# Patient Record
Sex: MALE | Race: WHITE | NOT HISPANIC OR LATINO | Employment: OTHER | URBAN - NONMETROPOLITAN AREA
[De-identification: names, ages, dates, MRNs, and addresses within clinical notes are randomized per-mention and may not be internally consistent; named-entity substitution may affect disease eponyms.]

---

## 2017-01-01 ENCOUNTER — APPOINTMENT (OUTPATIENT)
Dept: CARDIOLOGY | Facility: HOSPITAL | Age: 72
End: 2017-01-01
Attending: INTERNAL MEDICINE

## 2017-01-01 ENCOUNTER — APPOINTMENT (OUTPATIENT)
Dept: MRI IMAGING | Facility: HOSPITAL | Age: 72
End: 2017-01-01

## 2017-01-01 ENCOUNTER — DOCUMENTATION (OUTPATIENT)
Dept: NEUROLOGY | Facility: HOSPITAL | Age: 72
End: 2017-01-01

## 2017-01-01 ENCOUNTER — HOSPITAL ENCOUNTER (INPATIENT)
Facility: HOSPITAL | Age: 72
LOS: 3 days | End: 2017-01-21
Attending: INTERNAL MEDICINE | Admitting: INTERNAL MEDICINE

## 2017-01-01 ENCOUNTER — APPOINTMENT (OUTPATIENT)
Dept: CT IMAGING | Facility: HOSPITAL | Age: 72
End: 2017-01-01

## 2017-01-01 ENCOUNTER — APPOINTMENT (OUTPATIENT)
Dept: GENERAL RADIOLOGY | Facility: HOSPITAL | Age: 72
End: 2017-01-01

## 2017-01-01 ENCOUNTER — OFFICE VISIT (OUTPATIENT)
Dept: PRIMARY CARE CLINIC | Age: 72
End: 2017-01-01
Payer: MEDICARE

## 2017-01-01 VITALS
HEIGHT: 70 IN | WEIGHT: 227 LBS | RESPIRATION RATE: 16 BRPM | DIASTOLIC BLOOD PRESSURE: 80 MMHG | TEMPERATURE: 96.4 F | SYSTOLIC BLOOD PRESSURE: 160 MMHG | HEART RATE: 60 BPM | BODY MASS INDEX: 32.5 KG/M2 | OXYGEN SATURATION: 98 %

## 2017-01-01 VITALS
BODY MASS INDEX: 31.7 KG/M2 | DIASTOLIC BLOOD PRESSURE: 75 MMHG | TEMPERATURE: 99.2 F | HEART RATE: 51 BPM | OXYGEN SATURATION: 46 % | HEIGHT: 70 IN | WEIGHT: 221.4 LBS | RESPIRATION RATE: 20 BRPM | SYSTOLIC BLOOD PRESSURE: 129 MMHG

## 2017-01-01 DIAGNOSIS — Z13.1 SCREENING FOR DIABETES MELLITUS: ICD-10-CM

## 2017-01-01 DIAGNOSIS — Z79.4 TYPE 2 DIABETES MELLITUS WITHOUT COMPLICATION, WITH LONG-TERM CURRENT USE OF INSULIN (HCC): Primary | ICD-10-CM

## 2017-01-01 DIAGNOSIS — Z12.5 SCREENING FOR MALIGNANT NEOPLASM OF PROSTATE: ICD-10-CM

## 2017-01-01 DIAGNOSIS — Z78.9 IMPAIRED MOBILITY AND ADLS: ICD-10-CM

## 2017-01-01 DIAGNOSIS — R09.2 RESPIRATORY ARREST (HCC): ICD-10-CM

## 2017-01-01 DIAGNOSIS — Z13.6 ENCOUNTER FOR LIPID SCREENING FOR CARDIOVASCULAR DISEASE: ICD-10-CM

## 2017-01-01 DIAGNOSIS — Z79.899 MEDICATION MANAGEMENT: ICD-10-CM

## 2017-01-01 DIAGNOSIS — I21.4 NSTEMI (NON-ST ELEVATED MYOCARDIAL INFARCTION) (HCC): Primary | ICD-10-CM

## 2017-01-01 DIAGNOSIS — Z74.09 IMPAIRED MOBILITY AND ADLS: ICD-10-CM

## 2017-01-01 DIAGNOSIS — I63.9 CEREBROVASCULAR ACCIDENT (CVA), UNSPECIFIED MECHANISM (HCC): ICD-10-CM

## 2017-01-01 DIAGNOSIS — I10 ESSENTIAL HYPERTENSION: ICD-10-CM

## 2017-01-01 DIAGNOSIS — R13.12 OROPHARYNGEAL DYSPHAGIA: ICD-10-CM

## 2017-01-01 DIAGNOSIS — Z13.220 ENCOUNTER FOR LIPID SCREENING FOR CARDIOVASCULAR DISEASE: ICD-10-CM

## 2017-01-01 DIAGNOSIS — Z74.09 IMPAIRED FUNCTIONAL MOBILITY, BALANCE, AND ENDURANCE: ICD-10-CM

## 2017-01-01 DIAGNOSIS — E11.9 TYPE 2 DIABETES MELLITUS WITHOUT COMPLICATION, WITH LONG-TERM CURRENT USE OF INSULIN (HCC): Primary | ICD-10-CM

## 2017-01-01 LAB
ALBUMIN SERPL-MCNC: 3.6 G/DL (ref 3.5–5)
ALBUMIN SERPL-MCNC: 4 G/DL (ref 3.5–5)
ALBUMIN SERPL-MCNC: 4.2 G/DL (ref 3.5–5)
ALBUMIN SERPL-MCNC: 4.3 G/DL (ref 3.5–5.2)
ALBUMIN SERPL-MCNC: 4.4 G/DL (ref 3.5–5)
ALBUMIN/GLOB SERPL: 1.3 G/DL (ref 1.1–2.5)
ALP BLD-CCNC: 72 U/L (ref 40–130)
ALP SERPL-CCNC: 115 U/L (ref 24–120)
ALP SERPL-CCNC: 64 U/L (ref 24–120)
ALP SERPL-CCNC: 65 U/L (ref 24–120)
ALP SERPL-CCNC: 92 U/L (ref 24–120)
ALT SERPL W P-5'-P-CCNC: 22 U/L (ref 0–54)
ALT SERPL W P-5'-P-CCNC: 29 U/L (ref 0–54)
ALT SERPL W P-5'-P-CCNC: 30 U/L (ref 0–54)
ALT SERPL W P-5'-P-CCNC: 32 U/L (ref 0–54)
ALT SERPL-CCNC: 17 U/L (ref 5–41)
AMYLASE SERPL-CCNC: 64 U/L (ref 30–110)
ANION GAP SERPL CALCULATED.3IONS-SCNC: 12 MMOL/L (ref 4–13)
ANION GAP SERPL CALCULATED.3IONS-SCNC: 12 MMOL/L (ref 4–13)
ANION GAP SERPL CALCULATED.3IONS-SCNC: 14 MMOL/L (ref 4–13)
ANION GAP SERPL CALCULATED.3IONS-SCNC: 15 MMOL/L (ref 4–13)
ANION GAP SERPL CALCULATED.3IONS-SCNC: 15 MMOL/L (ref 7–19)
APTT PPP: 29.7 SECONDS (ref 24.1–34.8)
ARTERIAL PATENCY WRIST A: ABNORMAL
ARTICHOKE IGE QN: 162 MG/DL (ref 0–99)
AST SERPL-CCNC: 28 U/L (ref 7–45)
AST SERPL-CCNC: 28 U/L (ref 7–45)
AST SERPL-CCNC: 34 U/L (ref 5–40)
AST SERPL-CCNC: 39 U/L (ref 7–45)
AST SERPL-CCNC: 50 U/L (ref 7–45)
ATMOSPHERIC PRESS: ABNORMAL MMHG
BACTERIA SPEC AEROBE CULT: NORMAL
BACTERIA UR QL AUTO: ABNORMAL /HPF
BASE EXCESS BLDA CALC-SCNC: 3.2 MMOL/L (ref -2–2)
BASOPHILS # BLD AUTO: 0.01 10*3/MM3 (ref 0–0.2)
BASOPHILS # BLD AUTO: 0.02 10*3/MM3 (ref 0–0.2)
BASOPHILS # BLD AUTO: 0.04 10*3/MM3 (ref 0–0.2)
BASOPHILS NFR BLD AUTO: 0.1 % (ref 0–2)
BASOPHILS NFR BLD AUTO: 0.2 % (ref 0–2)
BASOPHILS NFR BLD AUTO: 0.4 % (ref 0–2)
BDY SITE: ABNORMAL
BH CV ECHO MEAS - AO MAX PG (FULL): 4.2 MMHG
BH CV ECHO MEAS - AO MAX PG: 8.1 MMHG
BH CV ECHO MEAS - AO MEAN PG (FULL): 2 MMHG
BH CV ECHO MEAS - AO MEAN PG: 4 MMHG
BH CV ECHO MEAS - AO ROOT AREA (BSA CORRECTED): 2.1
BH CV ECHO MEAS - AO ROOT AREA: 15.9 CM^2
BH CV ECHO MEAS - AO ROOT DIAM: 4.5 CM
BH CV ECHO MEAS - AO V2 MAX: 142 CM/SEC
BH CV ECHO MEAS - AO V2 MEAN: 99.5 CM/SEC
BH CV ECHO MEAS - AO V2 VTI: 33.2 CM
BH CV ECHO MEAS - AVA(I,A): 2.6 CM^2
BH CV ECHO MEAS - AVA(I,D): 2.6 CM^2
BH CV ECHO MEAS - AVA(V,A): 2.6 CM^2
BH CV ECHO MEAS - AVA(V,D): 2.6 CM^2
BH CV ECHO MEAS - BSA(HAYCOCK): 2.2 M^2
BH CV ECHO MEAS - BSA: 2.2 M^2
BH CV ECHO MEAS - BZI_BMI: 31.6 KILOGRAMS/M^2
BH CV ECHO MEAS - BZI_METRIC_HEIGHT: 177.8 CM
BH CV ECHO MEAS - BZI_METRIC_WEIGHT: 99.8 KG
BH CV ECHO MEAS - CONTRAST EF 4CH: 56 ML/M^2
BH CV ECHO MEAS - EDV(CUBED): 153.1 ML
BH CV ECHO MEAS - EDV(MOD-SP4): 44.3 ML
BH CV ECHO MEAS - EDV(TEICH): 138.3 ML
BH CV ECHO MEAS - EF(MOD-SP4): 56 %
BH CV ECHO MEAS - ESV(MOD-SP4): 19.5 ML
BH CV ECHO MEAS - IVS/LVPW: 1
BH CV ECHO MEAS - IVSD: 1.9 CM
BH CV ECHO MEAS - LA DIMENSION: 3.7 CM
BH CV ECHO MEAS - LA/AO: 0.82
BH CV ECHO MEAS - LAT PEAK E' VEL: 7.9 CM/SEC
BH CV ECHO MEAS - LV DIASTOLIC VOL/BSA (35-75): 20.4 ML/M^2
BH CV ECHO MEAS - LV MASS(C)D: 485.8 GRAMS
BH CV ECHO MEAS - LV MASS(C)DI: 223.5 GRAMS/M^2
BH CV ECHO MEAS - LV MAX PG: 3.9 MMHG
BH CV ECHO MEAS - LV MEAN PG: 2 MMHG
BH CV ECHO MEAS - LV SYSTOLIC VOL/BSA (12-30): 9 ML/M^2
BH CV ECHO MEAS - LV V1 MAX: 98.3 CM/SEC
BH CV ECHO MEAS - LV V1 MEAN: 70.2 CM/SEC
BH CV ECHO MEAS - LV V1 VTI: 22.9 CM
BH CV ECHO MEAS - LVIDD: 5.4 CM
BH CV ECHO MEAS - LVLD AP4: 7.5 CM
BH CV ECHO MEAS - LVLS AP4: 7 CM
BH CV ECHO MEAS - LVOT AREA (M): 3.8 CM^2
BH CV ECHO MEAS - LVOT AREA: 3.8 CM^2
BH CV ECHO MEAS - LVOT DIAM: 2.2 CM
BH CV ECHO MEAS - LVPWD: 1.8 CM
BH CV ECHO MEAS - MV A MAX VEL: 89.2 CM/SEC
BH CV ECHO MEAS - MV DEC TIME: 0.09 SEC
BH CV ECHO MEAS - MV E MAX VEL: 89.7 CM/SEC
BH CV ECHO MEAS - MV E/A: 1
BH CV ECHO MEAS - RAP SYSTOLE: 10 MMHG
BH CV ECHO MEAS - RVSP: 34.8 MMHG
BH CV ECHO MEAS - SI(AO): 242.9 ML/M^2
BH CV ECHO MEAS - SI(LVOT): 40 ML/M^2
BH CV ECHO MEAS - SI(MOD-SP4): 11.4 ML/M^2
BH CV ECHO MEAS - SV(AO): 528 ML
BH CV ECHO MEAS - SV(LVOT): 87.1 ML
BH CV ECHO MEAS - SV(MOD-SP4): 24.8 ML
BH CV ECHO MEAS - TR MAX VEL: 249 CM/SEC
BILIRUB SERPL-MCNC: 0.4 MG/DL (ref 0.2–1.2)
BILIRUB SERPL-MCNC: 0.5 MG/DL (ref 0.1–1)
BILIRUB SERPL-MCNC: 0.7 MG/DL (ref 0.1–1)
BILIRUB SERPL-MCNC: 0.7 MG/DL (ref 0.1–1)
BILIRUB SERPL-MCNC: 0.8 MG/DL (ref 0.1–1)
BILIRUB UR QL STRIP: NEGATIVE
BUN BLD-MCNC: 14 MG/DL (ref 5–21)
BUN BLD-MCNC: 15 MG/DL (ref 5–21)
BUN BLD-MCNC: 17 MG/DL (ref 5–21)
BUN BLD-MCNC: 19 MG/DL (ref 5–21)
BUN BLDV-MCNC: 10 MG/DL (ref 8–23)
BUN/CREAT SERPL: 19.2 (ref 7–25)
BUN/CREAT SERPL: 22.4 (ref 7–25)
BUN/CREAT SERPL: 23.6 (ref 7–25)
BUN/CREAT SERPL: 26.4 (ref 7–25)
CALCIUM SERPL-MCNC: 9.3 MG/DL (ref 8.8–10.2)
CALCIUM SPEC-SCNC: 8.6 MG/DL (ref 8.4–10.4)
CALCIUM SPEC-SCNC: 8.8 MG/DL (ref 8.4–10.4)
CALCIUM SPEC-SCNC: 8.9 MG/DL (ref 8.4–10.4)
CALCIUM SPEC-SCNC: 9.1 MG/DL (ref 8.4–10.4)
CHLORIDE BLD-SCNC: 102 MMOL/L (ref 98–111)
CHLORIDE SERPL-SCNC: 100 MMOL/L (ref 98–110)
CHLORIDE SERPL-SCNC: 101 MMOL/L (ref 98–110)
CHLORIDE SERPL-SCNC: 103 MMOL/L (ref 98–110)
CHLORIDE SERPL-SCNC: 107 MMOL/L (ref 98–110)
CHOLEST SERPL-MCNC: 191 MG/DL (ref 130–200)
CHOLESTEROL, TOTAL: 211 MG/DL (ref 160–199)
CK MB SERPL-CCNC: 2.87 NG/ML (ref 0–5)
CK SERPL-CCNC: 115 U/L (ref 0–203)
CLARITY UR: CLEAR
CO2 SERPL-SCNC: 22 MMOL/L (ref 24–31)
CO2 SERPL-SCNC: 22 MMOL/L (ref 24–31)
CO2 SERPL-SCNC: 25 MMOL/L (ref 24–31)
CO2 SERPL-SCNC: 26 MMOL/L (ref 24–31)
CO2: 24 MMOL/L (ref 22–29)
COHGB MFR BLD: 1 % (ref 0–5.1)
COLOR UR: YELLOW
CREAT BLD-MCNC: 0.67 MG/DL (ref 0.5–1.4)
CREAT BLD-MCNC: 0.72 MG/DL (ref 0.5–1.4)
CREAT BLD-MCNC: 0.72 MG/DL (ref 0.5–1.4)
CREAT BLD-MCNC: 0.73 MG/DL (ref 0.5–1.4)
CREAT SERPL-MCNC: 0.8 MG/DL (ref 0.5–1.2)
D DIMER PPP FEU-MCNC: 0.66 MG/L (FEU) (ref 0–0.5)
D-LACTATE SERPL-SCNC: 1 MMOL/L (ref 0.5–2)
D-LACTATE SERPL-SCNC: 1.2 MMOL/L (ref 0.5–2)
DEPRECATED RDW RBC AUTO: 40 FL (ref 40–54)
DEPRECATED RDW RBC AUTO: 40.3 FL (ref 40–54)
DEPRECATED RDW RBC AUTO: 41 FL (ref 40–54)
DEPRECATED RDW RBC AUTO: 41.8 FL (ref 40–54)
EOSINOPHIL # BLD AUTO: 0 10*3/MM3 (ref 0–0.7)
EOSINOPHIL # BLD AUTO: 0.01 10*3/MM3 (ref 0–0.7)
EOSINOPHIL # BLD AUTO: 0.24 10*3/MM3 (ref 0–0.7)
EOSINOPHIL NFR BLD AUTO: 0 % (ref 0–4)
EOSINOPHIL NFR BLD AUTO: 0.1 % (ref 0–4)
EOSINOPHIL NFR BLD AUTO: 2.6 % (ref 0–4)
EPAP: 8
ERYTHROCYTE [DISTWIDTH] IN BLOOD BY AUTOMATED COUNT: 13.5 % (ref 12–15)
ERYTHROCYTE [DISTWIDTH] IN BLOOD BY AUTOMATED COUNT: 13.7 % (ref 12–15)
GFR NON-AFRICAN AMERICAN: >60
GFR SERPL CREATININE-BSD FRML MDRD: 106 ML/MIN/1.73
GFR SERPL CREATININE-BSD FRML MDRD: 107 ML/MIN/1.73
GFR SERPL CREATININE-BSD FRML MDRD: 107 ML/MIN/1.73
GFR SERPL CREATININE-BSD FRML MDRD: 117 ML/MIN/1.73
GLOBULIN UR ELPH-MCNC: 2.8 GM/DL
GLOBULIN UR ELPH-MCNC: 3.2 GM/DL
GLOBULIN UR ELPH-MCNC: 3.2 GM/DL
GLOBULIN UR ELPH-MCNC: 3.3 GM/DL
GLOBULIN: 2.3 G/DL
GLUCOSE BLD-MCNC: 175 MG/DL (ref 74–109)
GLUCOSE BLD-MCNC: 235 MG/DL (ref 70–100)
GLUCOSE BLD-MCNC: 262 MG/DL (ref 70–100)
GLUCOSE BLD-MCNC: 281 MG/DL (ref 70–100)
GLUCOSE BLD-MCNC: 284 MG/DL (ref 70–100)
GLUCOSE BLDC GLUCOMTR-MCNC: 189 MG/DL (ref 70–130)
GLUCOSE BLDC GLUCOMTR-MCNC: 207 MG/DL (ref 70–130)
GLUCOSE BLDC GLUCOMTR-MCNC: 211 MG/DL (ref 70–130)
GLUCOSE BLDC GLUCOMTR-MCNC: 230 MG/DL (ref 70–130)
GLUCOSE BLDC GLUCOMTR-MCNC: 234 MG/DL (ref 70–130)
GLUCOSE BLDC GLUCOMTR-MCNC: 239 MG/DL (ref 70–130)
GLUCOSE BLDC GLUCOMTR-MCNC: 243 MG/DL (ref 70–130)
GLUCOSE BLDC GLUCOMTR-MCNC: 247 MG/DL (ref 70–130)
GLUCOSE BLDC GLUCOMTR-MCNC: 254 MG/DL (ref 70–130)
GLUCOSE BLDC GLUCOMTR-MCNC: 294 MG/DL (ref 70–130)
GLUCOSE BLDC GLUCOMTR-MCNC: 296 MG/DL (ref 70–130)
GLUCOSE UR STRIP-MCNC: ABNORMAL MG/DL
HBA1C MFR BLD: 8 %
HBA1C MFR BLD: 8.1 %
HCO3 BLDA-SCNC: 26.2 MMOL/L (ref 22–26)
HCT VFR BLD AUTO: 38.9 % (ref 40–52)
HCT VFR BLD AUTO: 41.3 % (ref 40–52)
HCT VFR BLD AUTO: 41.6 % (ref 40–52)
HCT VFR BLD AUTO: 42.9 % (ref 40–52)
HCT VFR BLD CALC: 42 % (ref 38–51)
HCT VFR BLD CALC: 43.3 % (ref 42–52)
HDLC SERPL-MCNC: 32 MG/DL (ref 55–121)
HDLC SERPL-MCNC: 33 MG/DL
HEMOGLOBIN: 14.4 G/DL (ref 14–18)
HGB BLD-MCNC: 13.6 G/DL (ref 14–18)
HGB BLD-MCNC: 14.4 G/DL (ref 14–18)
HGB BLD-MCNC: 14.5 G/DL (ref 14–18)
HGB BLD-MCNC: 14.5 G/DL (ref 14–18)
HGB BLDA-MCNC: 14.4 G/DL (ref 14–18)
HGB UR QL STRIP.AUTO: NEGATIVE
HOROWITZ INDEX BLD+IHG-RTO: <21 %
HYALINE CASTS UR QL AUTO: ABNORMAL /LPF
IMM GRANULOCYTES # BLD: 0.07 10*3/MM3 (ref 0–0.03)
IMM GRANULOCYTES NFR BLD: 0.8 % (ref 0–5)
INR PPP: 0.97 (ref 0.91–1.09)
IPAP: 16
KETONES UR QL STRIP: NEGATIVE
LDL CHOLESTEROL CALCULATED: 149 MG/DL
LDLC/HDLC SERPL: 3.99 {RATIO}
LEFT ATRIUM VOLUME INDEX: 39.1 ML/M2
LEUKOCYTE ESTERASE UR QL STRIP.AUTO: NEGATIVE
LIPASE SERPL-CCNC: 254 U/L (ref 23–203)
LIPASE SERPL-CCNC: 276 U/L (ref 23–203)
LIPASE SERPL-CCNC: 508 U/L (ref 23–203)
LYMPHOCYTES # BLD AUTO: 0.8 10*3/MM3 (ref 0.72–4.86)
LYMPHOCYTES # BLD AUTO: 0.82 10*3/MM3 (ref 0.72–4.86)
LYMPHOCYTES # BLD AUTO: 1.9 10*3/MM3 (ref 0.72–4.86)
LYMPHOCYTES NFR BLD AUTO: 20.5 % (ref 15–45)
LYMPHOCYTES NFR BLD AUTO: 6.7 % (ref 15–45)
LYMPHOCYTES NFR BLD AUTO: 6.8 % (ref 15–45)
MCH RBC QN AUTO: 28.7 PG (ref 28–32)
MCH RBC QN AUTO: 28.7 PG (ref 28–32)
MCH RBC QN AUTO: 28.9 PG (ref 28–32)
MCH RBC QN AUTO: 29 PG (ref 27–31)
MCH RBC QN AUTO: 29.4 PG (ref 28–32)
MCHC RBC AUTO-ENTMCNC: 33.3 G/DL (ref 33–37)
MCHC RBC AUTO-ENTMCNC: 33.8 G/DL (ref 33–36)
MCHC RBC AUTO-ENTMCNC: 34.9 G/DL (ref 33–36)
MCHC RBC AUTO-ENTMCNC: 34.9 G/DL (ref 33–36)
MCHC RBC AUTO-ENTMCNC: 35 G/DL (ref 33–36)
MCV RBC AUTO: 82.4 FL (ref 82–95)
MCV RBC AUTO: 82.6 FL (ref 82–95)
MCV RBC AUTO: 84.4 FL (ref 82–95)
MCV RBC AUTO: 85 FL (ref 82–95)
MCV RBC AUTO: 87.1 FL (ref 80–94)
METHGB BLD QL: 0.5 % (ref 0.4–1.5)
MODALITY: ABNORMAL
MONOCYTES # BLD AUTO: 0.44 10*3/MM3 (ref 0.19–1.3)
MONOCYTES # BLD AUTO: 0.58 10*3/MM3 (ref 0.19–1.3)
MONOCYTES # BLD AUTO: 0.89 10*3/MM3 (ref 0.19–1.3)
MONOCYTES NFR BLD AUTO: 3.6 % (ref 4–12)
MONOCYTES NFR BLD AUTO: 4.9 % (ref 4–12)
MONOCYTES NFR BLD AUTO: 9.6 % (ref 4–12)
MYOGLOBIN SERPL-MCNC: 26.2 NG/ML (ref 0–110)
NEUTROPHILS # BLD AUTO: 10.4 10*3/MM3 (ref 1.87–8.4)
NEUTROPHILS # BLD AUTO: 10.97 10*3/MM3 (ref 1.87–8.4)
NEUTROPHILS # BLD AUTO: 6.12 10*3/MM3 (ref 1.87–8.4)
NEUTROPHILS NFR BLD AUTO: 66.1 % (ref 39–78)
NEUTROPHILS NFR BLD AUTO: 88.2 % (ref 39–78)
NEUTROPHILS NFR BLD AUTO: 89.4 % (ref 39–78)
NITRITE UR QL STRIP: NEGATIVE
NT-PROBNP SERPL-MCNC: 1560 PG/ML (ref 0–900)
OXYHGB MFR BLDV: 94.7 % (ref 94–97)
PCO2 BLDA: 35.3 MM HG (ref 35–45)
PDW BLD-RTO: 13.7 % (ref 11.5–14.5)
PH BLDA: 7.49 PH UNITS (ref 7.35–7.45)
PH UR STRIP.AUTO: 7 [PH] (ref 5–8)
PLATELET # BLD AUTO: 319 10*3/MM3 (ref 130–400)
PLATELET # BLD AUTO: 322 10*3/MM3 (ref 130–400)
PLATELET # BLD AUTO: 327 10*3/MM3 (ref 130–400)
PLATELET # BLD AUTO: 338 10*3/MM3 (ref 130–400)
PLATELET # BLD: 362 K/UL (ref 130–400)
PMV BLD AUTO: 10.4 FL (ref 6–12)
PMV BLD AUTO: 10.6 FL (ref 6–12)
PMV BLD AUTO: 11 FL (ref 6–12)
PMV BLD AUTO: 11.3 FL (ref 6–12)
PMV BLD AUTO: 11.6 FL (ref 7.4–10.4)
PO2 BLDA: 76.2 MM HG (ref 80–100)
POTASSIUM BLD-SCNC: 3.3 MMOL/L (ref 3.5–5.3)
POTASSIUM BLD-SCNC: 3.5 MMOL/L (ref 3.5–5.3)
POTASSIUM BLD-SCNC: 3.5 MMOL/L (ref 3.5–5.3)
POTASSIUM BLD-SCNC: 3.7 MMOL/L (ref 3.5–5.3)
POTASSIUM BLDA-SCNC: 3.13 MMOL/L (ref 3.5–5)
POTASSIUM SERPL-SCNC: 4.3 MMOL/L (ref 3.5–5)
PROCALCITONIN SERPL-MCNC: <0.25 NG/ML
PROSTATE SPECIFIC ANTIGEN: 2.27 NG/ML (ref 0–4)
PROT SERPL-MCNC: 6.4 G/DL (ref 6.3–8.7)
PROT SERPL-MCNC: 7.2 G/DL (ref 6.3–8.7)
PROT SERPL-MCNC: 7.4 G/DL (ref 6.3–8.7)
PROT SERPL-MCNC: 7.7 G/DL (ref 6.3–8.7)
PROT UR QL STRIP: ABNORMAL
PROTHROMBIN TIME: 13.2 SECONDS (ref 11.9–14.6)
RBC # BLD AUTO: 4.71 10*6/MM3 (ref 4.8–5.9)
RBC # BLD AUTO: 4.93 10*6/MM3 (ref 4.8–5.9)
RBC # BLD AUTO: 5.01 10*6/MM3 (ref 4.8–5.9)
RBC # BLD AUTO: 5.05 10*6/MM3 (ref 4.8–5.9)
RBC # BLD: 4.97 M/UL (ref 4.7–6.1)
RBC # UR: ABNORMAL /HPF
REF LAB TEST METHOD: ABNORMAL
SODIUM BLD-SCNC: 136 MMOL/L (ref 135–145)
SODIUM BLD-SCNC: 139 MMOL/L (ref 135–145)
SODIUM BLD-SCNC: 140 MMOL/L (ref 135–145)
SODIUM BLD-SCNC: 141 MMOL/L (ref 136–145)
SODIUM BLD-SCNC: 144 MMOL/L (ref 135–145)
SODIUM BLDA-SCNC: 142.7 MMOL/L (ref 135–145)
SP GR UR STRIP: 1.02 (ref 1–1.03)
SQUAMOUS #/AREA URNS HPF: ABNORMAL /HPF
TOTAL PROTEIN: 6.6 G/DL (ref 6.6–8.7)
TRIGL SERPL-MCNC: 131 MG/DL (ref 0–149)
TRIGL SERPL-MCNC: 150 MG/DL (ref 150–199)
TROPONIN I SERPL-MCNC: 0.86 NG/ML (ref 0–0.03)
TROPONIN I SERPL-MCNC: 0.92 NG/ML (ref 0–0.03)
TROPONIN I SERPL-MCNC: 0.99 NG/ML (ref 0–0.03)
TROPONIN I SERPL-MCNC: 1.94 NG/ML (ref 0–0.07)
TROPONIN I SERPL-MCNC: 2.55 NG/ML (ref 0–0.07)
TSH SERPL DL<=0.05 MIU/L-ACNC: 3.49 UIU/ML (ref 0.27–4.2)
UROBILINOGEN UR QL STRIP: ABNORMAL
WBC # BLD: 7.8 K/UL (ref 4.8–10.8)
WBC NRBC COR # BLD: 11.79 10*3/MM3 (ref 4.8–10.8)
WBC NRBC COR # BLD: 12.26 10*3/MM3 (ref 4.8–10.8)
WBC NRBC COR # BLD: 17.03 10*3/MM3 (ref 4.8–10.8)
WBC NRBC COR # BLD: 9.26 10*3/MM3 (ref 4.8–10.8)
WBC UR QL AUTO: ABNORMAL /HPF

## 2017-01-01 PROCEDURE — 82553 CREATINE MB FRACTION: CPT | Performed by: NURSE PRACTITIONER

## 2017-01-01 PROCEDURE — 94799 UNLISTED PULMONARY SVC/PX: CPT

## 2017-01-01 PROCEDURE — 93010 ELECTROCARDIOGRAM REPORT: CPT | Performed by: INTERNAL MEDICINE

## 2017-01-01 PROCEDURE — 82962 GLUCOSE BLOOD TEST: CPT

## 2017-01-01 PROCEDURE — 51702 INSERT TEMP BLADDER CATH: CPT

## 2017-01-01 PROCEDURE — 25010000002 PROMETHAZINE PER 50 MG: Performed by: EMERGENCY MEDICINE

## 2017-01-01 PROCEDURE — 85025 COMPLETE CBC W/AUTO DIFF WBC: CPT | Performed by: INTERNAL MEDICINE

## 2017-01-01 PROCEDURE — 85379 FIBRIN DEGRADATION QUANT: CPT | Performed by: EMERGENCY MEDICINE

## 2017-01-01 PROCEDURE — G8419 CALC BMI OUT NRM PARAM NOF/U: HCPCS | Performed by: NURSE PRACTITIONER

## 2017-01-01 PROCEDURE — 82550 ASSAY OF CK (CPK): CPT | Performed by: NURSE PRACTITIONER

## 2017-01-01 PROCEDURE — 31500 INSERT EMERGENCY AIRWAY: CPT | Performed by: INTERNAL MEDICINE

## 2017-01-01 PROCEDURE — 82805 BLOOD GASES W/O2 SATURATION: CPT

## 2017-01-01 PROCEDURE — 93005 ELECTROCARDIOGRAM TRACING: CPT | Performed by: EMERGENCY MEDICINE

## 2017-01-01 PROCEDURE — 97530 THERAPEUTIC ACTIVITIES: CPT

## 2017-01-01 PROCEDURE — 83036 HEMOGLOBIN GLYCOSYLATED A1C: CPT | Performed by: INTERNAL MEDICINE

## 2017-01-01 PROCEDURE — 83874 ASSAY OF MYOGLOBIN: CPT | Performed by: NURSE PRACTITIONER

## 2017-01-01 PROCEDURE — G8996 SWALLOW CURRENT STATUS: HCPCS | Performed by: SPEECH-LANGUAGE PATHOLOGIST

## 2017-01-01 PROCEDURE — G8979 MOBILITY GOAL STATUS: HCPCS

## 2017-01-01 PROCEDURE — 25010000002 ONDANSETRON PER 1 MG: Performed by: EMERGENCY MEDICINE

## 2017-01-01 PROCEDURE — 94002 VENT MGMT INPAT INIT DAY: CPT

## 2017-01-01 PROCEDURE — 85730 THROMBOPLASTIN TIME PARTIAL: CPT | Performed by: EMERGENCY MEDICINE

## 2017-01-01 PROCEDURE — G8978 MOBILITY CURRENT STATUS: HCPCS

## 2017-01-01 PROCEDURE — 99285 EMERGENCY DEPT VISIT HI MDM: CPT

## 2017-01-01 PROCEDURE — G8427 DOCREV CUR MEDS BY ELIG CLIN: HCPCS | Performed by: NURSE PRACTITIONER

## 2017-01-01 PROCEDURE — 80053 COMPREHEN METABOLIC PANEL: CPT | Performed by: INTERNAL MEDICINE

## 2017-01-01 PROCEDURE — 94760 N-INVAS EAR/PLS OXIMETRY 1: CPT

## 2017-01-01 PROCEDURE — 92610 EVALUATE SWALLOWING FUNCTION: CPT

## 2017-01-01 PROCEDURE — 25010000002 DIPHENHYDRAMINE PER 50 MG: Performed by: PSYCHIATRY & NEUROLOGY

## 2017-01-01 PROCEDURE — 63710000001 INSULIN DETEMIR PER 5 UNITS: Performed by: INTERNAL MEDICINE

## 2017-01-01 PROCEDURE — 63710000001 INSULIN LISPRO (HUMAN) PER 5 UNITS: Performed by: INTERNAL MEDICINE

## 2017-01-01 PROCEDURE — 99223 1ST HOSP IP/OBS HIGH 75: CPT | Performed by: PSYCHIATRY & NEUROLOGY

## 2017-01-01 PROCEDURE — 81001 URINALYSIS AUTO W/SCOPE: CPT | Performed by: EMERGENCY MEDICINE

## 2017-01-01 PROCEDURE — 5A09357 ASSISTANCE WITH RESPIRATORY VENTILATION, LESS THAN 24 CONSECUTIVE HOURS, CONTINUOUS POSITIVE AIRWAY PRESSURE: ICD-10-PCS | Performed by: INTERNAL MEDICINE

## 2017-01-01 PROCEDURE — 97535 SELF CARE MNGMENT TRAINING: CPT | Performed by: OCCUPATIONAL THERAPIST

## 2017-01-01 PROCEDURE — 83605 ASSAY OF LACTIC ACID: CPT | Performed by: INTERNAL MEDICINE

## 2017-01-01 PROCEDURE — G8987 SELF CARE CURRENT STATUS: HCPCS | Performed by: OCCUPATIONAL THERAPIST

## 2017-01-01 PROCEDURE — 0 IOPAMIDOL PER 1 ML: Performed by: EMERGENCY MEDICINE

## 2017-01-01 PROCEDURE — 84484 ASSAY OF TROPONIN QUANT: CPT | Performed by: NURSE PRACTITIONER

## 2017-01-01 PROCEDURE — 25010000002 ONDANSETRON PER 1 MG: Performed by: INTERNAL MEDICINE

## 2017-01-01 PROCEDURE — 36415 COLL VENOUS BLD VENIPUNCTURE: CPT | Performed by: NURSE PRACTITIONER

## 2017-01-01 PROCEDURE — 94660 CPAP INITIATION&MGMT: CPT

## 2017-01-01 PROCEDURE — 85025 COMPLETE CBC W/AUTO DIFF WBC: CPT | Performed by: EMERGENCY MEDICINE

## 2017-01-01 PROCEDURE — 83605 ASSAY OF LACTIC ACID: CPT | Performed by: EMERGENCY MEDICINE

## 2017-01-01 PROCEDURE — 83690 ASSAY OF LIPASE: CPT | Performed by: INTERNAL MEDICINE

## 2017-01-01 PROCEDURE — 82150 ASSAY OF AMYLASE: CPT | Performed by: EMERGENCY MEDICINE

## 2017-01-01 PROCEDURE — 80061 LIPID PANEL: CPT | Performed by: INTERNAL MEDICINE

## 2017-01-01 PROCEDURE — 0BH17EZ INSERTION OF ENDOTRACHEAL AIRWAY INTO TRACHEA, VIA NATURAL OR ARTIFICIAL OPENING: ICD-10-PCS | Performed by: INTERNAL MEDICINE

## 2017-01-01 PROCEDURE — 80053 COMPREHEN METABOLIC PANEL: CPT | Performed by: EMERGENCY MEDICINE

## 2017-01-01 PROCEDURE — 25010000002 ENOXAPARIN PER 10 MG: Performed by: INTERNAL MEDICINE

## 2017-01-01 PROCEDURE — 94770: CPT

## 2017-01-01 PROCEDURE — 97162 PT EVAL MOD COMPLEX 30 MIN: CPT

## 2017-01-01 PROCEDURE — 97166 OT EVAL MOD COMPLEX 45 MIN: CPT | Performed by: OCCUPATIONAL THERAPIST

## 2017-01-01 PROCEDURE — 84145 PROCALCITONIN (PCT): CPT | Performed by: EMERGENCY MEDICINE

## 2017-01-01 PROCEDURE — 5A1935Z RESPIRATORY VENTILATION, LESS THAN 24 CONSECUTIVE HOURS: ICD-10-PCS | Performed by: INTERNAL MEDICINE

## 2017-01-01 PROCEDURE — 1036F TOBACCO NON-USER: CPT | Performed by: NURSE PRACTITIONER

## 2017-01-01 PROCEDURE — 93306 TTE W/DOPPLER COMPLETE: CPT

## 2017-01-01 PROCEDURE — 99232 SBSQ HOSP IP/OBS MODERATE 35: CPT | Performed by: PSYCHIATRY & NEUROLOGY

## 2017-01-01 PROCEDURE — G8484 FLU IMMUNIZE NO ADMIN: HCPCS | Performed by: NURSE PRACTITIONER

## 2017-01-01 PROCEDURE — 1123F ACP DISCUSS/DSCN MKR DOCD: CPT | Performed by: NURSE PRACTITIONER

## 2017-01-01 PROCEDURE — 99221 1ST HOSP IP/OBS SF/LOW 40: CPT | Performed by: NEUROLOGICAL SURGERY

## 2017-01-01 PROCEDURE — 84484 ASSAY OF TROPONIN QUANT: CPT

## 2017-01-01 PROCEDURE — 83050 HGB METHEMOGLOBIN QUAN: CPT

## 2017-01-01 PROCEDURE — 25010000002 HYDRALAZINE PER 20 MG: Performed by: INTERNAL MEDICINE

## 2017-01-01 PROCEDURE — G8988 SELF CARE GOAL STATUS: HCPCS | Performed by: OCCUPATIONAL THERAPIST

## 2017-01-01 PROCEDURE — 87086 URINE CULTURE/COLONY COUNT: CPT | Performed by: INTERNAL MEDICINE

## 2017-01-01 PROCEDURE — 99222 1ST HOSP IP/OBS MODERATE 55: CPT | Performed by: NURSE PRACTITIONER

## 2017-01-01 PROCEDURE — 74177 CT ABD & PELVIS W/CONTRAST: CPT

## 2017-01-01 PROCEDURE — G8997 SWALLOW GOAL STATUS: HCPCS | Performed by: SPEECH-LANGUAGE PATHOLOGIST

## 2017-01-01 PROCEDURE — 82375 ASSAY CARBOXYHB QUANT: CPT

## 2017-01-01 PROCEDURE — 85610 PROTHROMBIN TIME: CPT | Performed by: EMERGENCY MEDICINE

## 2017-01-01 PROCEDURE — 99214 OFFICE O/P EST MOD 30 MIN: CPT | Performed by: NURSE PRACTITIONER

## 2017-01-01 PROCEDURE — 70496 CT ANGIOGRAPHY HEAD: CPT

## 2017-01-01 PROCEDURE — 36415 COLL VENOUS BLD VENIPUNCTURE: CPT | Performed by: EMERGENCY MEDICINE

## 2017-01-01 PROCEDURE — 93306 TTE W/DOPPLER COMPLETE: CPT | Performed by: INTERNAL MEDICINE

## 2017-01-01 PROCEDURE — 25010000002 ONDANSETRON PER 1 MG

## 2017-01-01 PROCEDURE — 25010000002 MORPHINE PER 10 MG: Performed by: INTERNAL MEDICINE

## 2017-01-01 PROCEDURE — 36600 WITHDRAWAL OF ARTERIAL BLOOD: CPT

## 2017-01-01 PROCEDURE — G8998 SWALLOW D/C STATUS: HCPCS | Performed by: SPEECH-LANGUAGE PATHOLOGIST

## 2017-01-01 PROCEDURE — 85027 COMPLETE CBC AUTOMATED: CPT | Performed by: INTERNAL MEDICINE

## 2017-01-01 PROCEDURE — 70450 CT HEAD/BRAIN W/O DYE: CPT

## 2017-01-01 PROCEDURE — 83880 ASSAY OF NATRIURETIC PEPTIDE: CPT | Performed by: EMERGENCY MEDICINE

## 2017-01-01 PROCEDURE — 0 IOPAMIDOL PER 1 ML: Performed by: INTERNAL MEDICINE

## 2017-01-01 PROCEDURE — 3017F COLORECTAL CA SCREEN DOC REV: CPT | Performed by: NURSE PRACTITIONER

## 2017-01-01 PROCEDURE — 70551 MRI BRAIN STEM W/O DYE: CPT

## 2017-01-01 PROCEDURE — 25010000002 LORAZEPAM PER 2 MG: Performed by: PSYCHIATRY & NEUROLOGY

## 2017-01-01 PROCEDURE — 83690 ASSAY OF LIPASE: CPT | Performed by: EMERGENCY MEDICINE

## 2017-01-01 PROCEDURE — 71010 HC CHEST PA OR AP: CPT

## 2017-01-01 PROCEDURE — 70498 CT ANGIOGRAPHY NECK: CPT

## 2017-01-01 PROCEDURE — 71275 CT ANGIOGRAPHY CHEST: CPT

## 2017-01-01 PROCEDURE — 3045F PR MOST RECENT HEMOGLOBIN A1C LEVEL 7.0-9.0%: CPT | Performed by: NURSE PRACTITIONER

## 2017-01-01 PROCEDURE — 4040F PNEUMOC VAC/ADMIN/RCVD: CPT | Performed by: NURSE PRACTITIONER

## 2017-01-01 RX ORDER — ONDANSETRON 2 MG/ML
INJECTION INTRAMUSCULAR; INTRAVENOUS
Status: COMPLETED
Start: 2017-01-01 | End: 2017-01-01

## 2017-01-01 RX ORDER — ACETAMINOPHEN 325 MG/1
650 TABLET ORAL EVERY 4 HOURS PRN
Status: DISCONTINUED | OUTPATIENT
Start: 2017-01-01 | End: 2017-01-01 | Stop reason: HOSPADM

## 2017-01-01 RX ORDER — ASPIRIN 300 MG/1
300 SUPPOSITORY RECTAL DAILY
Status: DISCONTINUED | OUTPATIENT
Start: 2017-01-01 | End: 2017-01-01

## 2017-01-01 RX ORDER — PROMETHAZINE HYDROCHLORIDE 25 MG/ML
12.5 INJECTION, SOLUTION INTRAMUSCULAR; INTRAVENOUS EVERY 6 HOURS PRN
Status: DISCONTINUED | OUTPATIENT
Start: 2017-01-01 | End: 2017-01-01 | Stop reason: HOSPADM

## 2017-01-01 RX ORDER — DORZOLAMIDE HCL 20 MG/ML
1 SOLUTION/ DROPS OPHTHALMIC 3 TIMES DAILY
Status: DISCONTINUED | OUTPATIENT
Start: 2017-01-01 | End: 2017-01-01

## 2017-01-01 RX ORDER — HYDRALAZINE HYDROCHLORIDE 20 MG/ML
10 INJECTION INTRAMUSCULAR; INTRAVENOUS
Status: DISCONTINUED | OUTPATIENT
Start: 2017-01-01 | End: 2017-01-01 | Stop reason: HOSPADM

## 2017-01-01 RX ORDER — ACETAMINOPHEN 650 MG/1
650 SUPPOSITORY RECTAL EVERY 4 HOURS PRN
Status: DISCONTINUED | OUTPATIENT
Start: 2017-01-01 | End: 2017-01-01 | Stop reason: HOSPADM

## 2017-01-01 RX ORDER — LABETALOL HYDROCHLORIDE 5 MG/ML
10 INJECTION, SOLUTION INTRAVENOUS EVERY 4 HOURS PRN
Status: COMPLETED | OUTPATIENT
Start: 2017-01-01 | End: 2017-01-01

## 2017-01-01 RX ORDER — DORZOLAMIDE HCL 20 MG/ML
SOLUTION/ DROPS OPHTHALMIC
COMMUNITY

## 2017-01-01 RX ORDER — DEXTROSE MONOHYDRATE 25 G/50ML
25 INJECTION, SOLUTION INTRAVENOUS
Status: DISCONTINUED | OUTPATIENT
Start: 2017-01-01 | End: 2017-01-01 | Stop reason: HOSPADM

## 2017-01-01 RX ORDER — LATANOPROST 50 UG/ML
1 SOLUTION/ DROPS OPHTHALMIC DAILY
Status: DISCONTINUED | OUTPATIENT
Start: 2017-01-01 | End: 2017-01-01

## 2017-01-01 RX ORDER — ONDANSETRON 2 MG/ML
4 INJECTION INTRAMUSCULAR; INTRAVENOUS ONCE
Status: COMPLETED | OUTPATIENT
Start: 2017-01-01 | End: 2017-01-01

## 2017-01-01 RX ORDER — AMLODIPINE BESYLATE 10 MG/1
10 TABLET ORAL DAILY
Qty: 90 TABLET | Refills: 2 | Status: SHIPPED | OUTPATIENT
Start: 2017-01-01

## 2017-01-01 RX ORDER — TAMSULOSIN HYDROCHLORIDE 0.4 MG/1
0.8 CAPSULE ORAL DAILY
Status: DISCONTINUED | OUTPATIENT
Start: 2017-01-01 | End: 2017-01-01

## 2017-01-01 RX ORDER — LISINOPRIL 40 MG/1
TABLET ORAL
COMMUNITY
Start: 2017-01-01

## 2017-01-01 RX ORDER — POTASSIUM CHLORIDE 14.9 MG/ML
10 INJECTION INTRAVENOUS
Status: COMPLETED | OUTPATIENT
Start: 2017-01-01 | End: 2017-01-01

## 2017-01-01 RX ORDER — SODIUM CHLORIDE 9 MG/ML
50 INJECTION, SOLUTION INTRAVENOUS CONTINUOUS
Status: DISCONTINUED | OUTPATIENT
Start: 2017-01-01 | End: 2017-01-01

## 2017-01-01 RX ORDER — DORZOLAMIDE HYDROCHLORIDE AND TIMOLOL MALEATE 20; 5 MG/ML; MG/ML
SOLUTION/ DROPS OPHTHALMIC
COMMUNITY
Start: 2016-01-01

## 2017-01-01 RX ORDER — LORAZEPAM 2 MG/ML
2 INJECTION INTRAMUSCULAR
Status: DISCONTINUED | OUTPATIENT
Start: 2017-01-01 | End: 2017-01-01 | Stop reason: HOSPADM

## 2017-01-01 RX ORDER — LISINOPRIL 10 MG/1
10 TABLET ORAL
Status: DISCONTINUED | OUTPATIENT
Start: 2017-01-01 | End: 2017-01-01

## 2017-01-01 RX ORDER — LISINOPRIL 40 MG/1
40 TABLET ORAL DAILY
Qty: 90 TABLET | Refills: 2 | Status: SHIPPED | OUTPATIENT
Start: 2017-01-01

## 2017-01-01 RX ORDER — SODIUM CHLORIDE 9 MG/ML
50 INJECTION, SOLUTION INTRAVENOUS CONTINUOUS
Status: DISCONTINUED | OUTPATIENT
Start: 2017-01-01 | End: 2017-01-01 | Stop reason: HOSPADM

## 2017-01-01 RX ORDER — ASPIRIN 325 MG
325 TABLET ORAL DAILY
Status: DISCONTINUED | OUTPATIENT
Start: 2017-01-01 | End: 2017-01-01 | Stop reason: HOSPADM

## 2017-01-01 RX ORDER — SODIUM CHLORIDE 0.9 % (FLUSH) 0.9 %
10 SYRINGE (ML) INJECTION AS NEEDED
Status: DISCONTINUED | OUTPATIENT
Start: 2017-01-01 | End: 2017-01-01 | Stop reason: HOSPADM

## 2017-01-01 RX ORDER — LATANOPROST 50 UG/ML
SOLUTION/ DROPS OPHTHALMIC
COMMUNITY
Start: 2014-10-30

## 2017-01-01 RX ORDER — ASPIRIN 81 MG/1
81 TABLET, CHEWABLE ORAL DAILY
Status: DISCONTINUED | OUTPATIENT
Start: 2017-01-01 | End: 2017-01-01

## 2017-01-01 RX ORDER — ONDANSETRON 2 MG/ML
4 INJECTION INTRAMUSCULAR; INTRAVENOUS EVERY 6 HOURS PRN
Status: DISCONTINUED | OUTPATIENT
Start: 2017-01-01 | End: 2017-01-01 | Stop reason: HOSPADM

## 2017-01-01 RX ORDER — MORPHINE SULFATE 4 MG/ML
4 INJECTION, SOLUTION INTRAMUSCULAR; INTRAVENOUS
Status: DISCONTINUED | OUTPATIENT
Start: 2017-01-01 | End: 2017-01-01 | Stop reason: HOSPADM

## 2017-01-01 RX ORDER — TAMSULOSIN HYDROCHLORIDE 0.4 MG/1
CAPSULE ORAL
COMMUNITY
Start: 2016-01-01

## 2017-01-01 RX ORDER — SODIUM CHLORIDE 0.9 % (FLUSH) 0.9 %
1-10 SYRINGE (ML) INJECTION AS NEEDED
Status: DISCONTINUED | OUTPATIENT
Start: 2017-01-01 | End: 2017-01-01 | Stop reason: HOSPADM

## 2017-01-01 RX ORDER — HYDROCHLOROTHIAZIDE 25 MG/1
25 TABLET ORAL DAILY
Qty: 90 TABLET | Refills: 2 | Status: SHIPPED | OUTPATIENT
Start: 2017-01-01

## 2017-01-01 RX ORDER — BRIMONIDINE TARTRATE 2 MG/ML
SOLUTION/ DROPS OPHTHALMIC
COMMUNITY
Start: 2014-10-06

## 2017-01-01 RX ORDER — BRIMONIDINE TARTRATE 2 MG/ML
1 SOLUTION/ DROPS OPHTHALMIC 3 TIMES DAILY
Status: DISCONTINUED | OUTPATIENT
Start: 2017-01-01 | End: 2017-01-01 | Stop reason: HOSPADM

## 2017-01-01 RX ORDER — HYDROCHLOROTHIAZIDE 25 MG/1
TABLET ORAL
COMMUNITY
Start: 2017-01-01

## 2017-01-01 RX ORDER — DORZOLAMIDE HYDROCHLORIDE AND TIMOLOL MALEATE 20; 5 MG/ML; MG/ML
1 SOLUTION/ DROPS OPHTHALMIC 2 TIMES DAILY
Status: DISCONTINUED | OUTPATIENT
Start: 2017-01-01 | End: 2017-01-01 | Stop reason: HOSPADM

## 2017-01-01 RX ORDER — LORAZEPAM 2 MG/ML
0.5 INJECTION INTRAMUSCULAR
Status: DISCONTINUED | OUTPATIENT
Start: 2017-01-01 | End: 2017-01-01 | Stop reason: HOSPADM

## 2017-01-01 RX ORDER — ATORVASTATIN CALCIUM 40 MG/1
40 TABLET, FILM COATED ORAL DAILY
Status: DISCONTINUED | OUTPATIENT
Start: 2017-01-01 | End: 2017-01-01

## 2017-01-01 RX ORDER — LABETALOL HYDROCHLORIDE 5 MG/ML
10 INJECTION, SOLUTION INTRAVENOUS EVERY 4 HOURS PRN
Status: DISCONTINUED | OUTPATIENT
Start: 2017-01-01 | End: 2017-01-01 | Stop reason: HOSPADM

## 2017-01-01 RX ORDER — ASPIRIN 81 MG/1
81 TABLET ORAL DAILY
Status: DISCONTINUED | OUTPATIENT
Start: 2017-01-01 | End: 2017-01-01 | Stop reason: SDUPTHER

## 2017-01-01 RX ORDER — NICOTINE POLACRILEX 4 MG
15 LOZENGE BUCCAL
Status: DISCONTINUED | OUTPATIENT
Start: 2017-01-01 | End: 2017-01-01 | Stop reason: HOSPADM

## 2017-01-01 RX ORDER — FENOFIBRATE 160 MG/1
TABLET ORAL
COMMUNITY
Start: 2016-01-01

## 2017-01-01 RX ORDER — ATORVASTATIN CALCIUM 40 MG/1
80 TABLET, FILM COATED ORAL NIGHTLY
Status: DISCONTINUED | OUTPATIENT
Start: 2017-01-01 | End: 2017-01-01 | Stop reason: HOSPADM

## 2017-01-01 RX ORDER — LATANOPROST 50 UG/ML
1 SOLUTION/ DROPS OPHTHALMIC NIGHTLY
Status: DISCONTINUED | OUTPATIENT
Start: 2017-01-01 | End: 2017-01-01 | Stop reason: HOSPADM

## 2017-01-01 RX ORDER — PROMETHAZINE HYDROCHLORIDE 25 MG/ML
12.5 INJECTION, SOLUTION INTRAMUSCULAR; INTRAVENOUS ONCE
Status: COMPLETED | OUTPATIENT
Start: 2017-01-01 | End: 2017-01-01

## 2017-01-01 RX ORDER — TAMSULOSIN HYDROCHLORIDE 0.4 MG/1
0.4 CAPSULE ORAL DAILY
Status: DISCONTINUED | OUTPATIENT
Start: 2017-01-01 | End: 2017-01-01

## 2017-01-01 RX ORDER — DIPHENHYDRAMINE HYDROCHLORIDE 50 MG/ML
12.5 INJECTION INTRAMUSCULAR; INTRAVENOUS ONCE
Status: COMPLETED | OUTPATIENT
Start: 2017-01-01 | End: 2017-01-01

## 2017-01-01 RX ORDER — AMLODIPINE BESYLATE 10 MG/1
TABLET ORAL
COMMUNITY
Start: 2017-01-01

## 2017-01-01 RX ORDER — ONDANSETRON 2 MG/ML
INJECTION INTRAMUSCULAR; INTRAVENOUS
Status: DISPENSED
Start: 2017-01-01 | End: 2017-01-01

## 2017-01-01 RX ADMIN — INSULIN LISPRO 4 UNITS: 100 INJECTION, SOLUTION INTRAVENOUS; SUBCUTANEOUS at 08:33

## 2017-01-01 RX ADMIN — PROMETHAZINE HYDROCHLORIDE 12.5 MG: 25 INJECTION INTRAMUSCULAR; INTRAVENOUS at 17:57

## 2017-01-01 RX ADMIN — INSULIN LISPRO 4 UNITS: 100 INJECTION, SOLUTION INTRAVENOUS; SUBCUTANEOUS at 00:12

## 2017-01-01 RX ADMIN — INSULIN LISPRO 4 UNITS: 100 INJECTION, SOLUTION INTRAVENOUS; SUBCUTANEOUS at 18:00

## 2017-01-01 RX ADMIN — LATANOPROST 1 DROP: 50 SOLUTION OPHTHALMIC at 20:45

## 2017-01-01 RX ADMIN — POTASSIUM CHLORIDE 10 MEQ: 14.9 INJECTION, SOLUTION INTRAVENOUS at 10:50

## 2017-01-01 RX ADMIN — INSULIN LISPRO 4 UNITS: 100 INJECTION, SOLUTION INTRAVENOUS; SUBCUTANEOUS at 12:38

## 2017-01-01 RX ADMIN — LORAZEPAM 0.5 MG: 2 INJECTION INTRAMUSCULAR; INTRAVENOUS at 15:17

## 2017-01-01 RX ADMIN — ASPIRIN 325 MG: 325 TABLET, COATED ORAL at 10:24

## 2017-01-01 RX ADMIN — LABETALOL HYDROCHLORIDE 10 MG: 5 INJECTION, SOLUTION INTRAVENOUS at 00:07

## 2017-01-01 RX ADMIN — INSULIN LISPRO 4 UNITS: 100 INJECTION, SOLUTION INTRAVENOUS; SUBCUTANEOUS at 17:59

## 2017-01-01 RX ADMIN — DORZOLAMIDE HYDROCHLORIDE AND TIMOLOL MALEATE 1 DROP: 20; 5 SOLUTION/ DROPS OPHTHALMIC at 18:01

## 2017-01-01 RX ADMIN — HYDRALAZINE HYDROCHLORIDE 10 MG: 20 INJECTION INTRAMUSCULAR; INTRAVENOUS at 23:49

## 2017-01-01 RX ADMIN — ONDANSETRON 4 MG: 2 INJECTION INTRAMUSCULAR; INTRAVENOUS at 09:18

## 2017-01-01 RX ADMIN — INSULIN LISPRO 6 UNITS: 100 INJECTION, SOLUTION INTRAVENOUS; SUBCUTANEOUS at 20:20

## 2017-01-01 RX ADMIN — BRIMONIDINE TARTRATE 1 DROP: 2 SOLUTION OPHTHALMIC at 16:25

## 2017-01-01 RX ADMIN — TAMSULOSIN HYDROCHLORIDE 0.4 MG: 0.4 CAPSULE ORAL at 10:24

## 2017-01-01 RX ADMIN — SODIUM CHLORIDE 50 ML/HR: 9 INJECTION, SOLUTION INTRAVENOUS at 15:17

## 2017-01-01 RX ADMIN — BRIMONIDINE TARTRATE 1 DROP: 2 SOLUTION OPHTHALMIC at 08:34

## 2017-01-01 RX ADMIN — DESMOPRESSIN ACETATE 80 MG: 0.2 TABLET ORAL at 00:12

## 2017-01-01 RX ADMIN — SODIUM CHLORIDE 50 ML/HR: 9 INJECTION, SOLUTION INTRAVENOUS at 00:05

## 2017-01-01 RX ADMIN — INSULIN DETEMIR 25 UNITS: 100 INJECTION, SOLUTION SUBCUTANEOUS at 20:20

## 2017-01-01 RX ADMIN — INSULIN LISPRO 4 UNITS: 100 INJECTION, SOLUTION INTRAVENOUS; SUBCUTANEOUS at 23:39

## 2017-01-01 RX ADMIN — DORZOLAMIDE HYDROCHLORIDE AND TIMOLOL MALEATE 1 DROP: 20; 5 SOLUTION/ DROPS OPHTHALMIC at 08:34

## 2017-01-01 RX ADMIN — LORAZEPAM 0.5 MG: 2 INJECTION INTRAMUSCULAR; INTRAVENOUS at 00:56

## 2017-01-01 RX ADMIN — DORZOLAMIDE HYDROCHLORIDE AND TIMOLOL MALEATE 1 DROP: 20; 5 SOLUTION/ DROPS OPHTHALMIC at 10:23

## 2017-01-01 RX ADMIN — IOPAMIDOL 150 ML: 755 INJECTION, SOLUTION INTRAVENOUS at 15:15

## 2017-01-01 RX ADMIN — POTASSIUM CHLORIDE 10 MEQ: 14.9 INJECTION, SOLUTION INTRAVENOUS at 11:41

## 2017-01-01 RX ADMIN — DORZOLAMIDE HYDROCHLORIDE AND TIMOLOL MALEATE 1 DROP: 20; 5 SOLUTION/ DROPS OPHTHALMIC at 17:59

## 2017-01-01 RX ADMIN — HYDRALAZINE HYDROCHLORIDE 10 MG: 20 INJECTION INTRAMUSCULAR; INTRAVENOUS at 01:34

## 2017-01-01 RX ADMIN — BRIMONIDINE TARTRATE 1 DROP: 2 SOLUTION OPHTHALMIC at 00:09

## 2017-01-01 RX ADMIN — ENOXAPARIN SODIUM 40 MG: 40 INJECTION SUBCUTANEOUS at 10:24

## 2017-01-01 RX ADMIN — LORAZEPAM 0.5 MG: 2 INJECTION INTRAMUSCULAR; INTRAVENOUS at 01:43

## 2017-01-01 RX ADMIN — LABETALOL HYDROCHLORIDE 10 MG: 5 INJECTION, SOLUTION INTRAVENOUS at 10:39

## 2017-01-01 RX ADMIN — POTASSIUM CHLORIDE 10 MEQ: 14.9 INJECTION, SOLUTION INTRAVENOUS at 12:38

## 2017-01-01 RX ADMIN — IOPAMIDOL 150 ML: 755 INJECTION, SOLUTION INTRAVENOUS at 16:11

## 2017-01-01 RX ADMIN — ONDANSETRON 4 MG: 2 INJECTION, SOLUTION INTRAMUSCULAR; INTRAVENOUS at 14:00

## 2017-01-01 RX ADMIN — LORAZEPAM 0.5 MG: 2 INJECTION INTRAMUSCULAR; INTRAVENOUS at 11:37

## 2017-01-01 RX ADMIN — NICARDIPINE HYDROCHLORIDE 10 MG/HR: 25 INJECTION INTRAVENOUS at 23:22

## 2017-01-01 RX ADMIN — ACETAMINOPHEN 650 MG: 325 TABLET ORAL at 16:25

## 2017-01-01 RX ADMIN — NICARDIPINE HYDROCHLORIDE 5 MG/HR: 25 INJECTION INTRAVENOUS at 10:50

## 2017-01-01 RX ADMIN — NICARDIPINE HYDROCHLORIDE 10 MG/HR: 25 INJECTION INTRAVENOUS at 01:51

## 2017-01-01 RX ADMIN — DESMOPRESSIN ACETATE 80 MG: 0.2 TABLET ORAL at 20:19

## 2017-01-01 RX ADMIN — DIPHENHYDRAMINE HYDROCHLORIDE 12.5 MG: 50 INJECTION, SOLUTION INTRAMUSCULAR; INTRAVENOUS at 00:43

## 2017-01-01 RX ADMIN — BRIMONIDINE TARTRATE 1 DROP: 2 SOLUTION OPHTHALMIC at 20:45

## 2017-01-01 RX ADMIN — LABETALOL HYDROCHLORIDE 10 MG: 5 INJECTION, SOLUTION INTRAVENOUS at 05:33

## 2017-01-01 RX ADMIN — ENOXAPARIN SODIUM 40 MG: 40 INJECTION SUBCUTANEOUS at 08:37

## 2017-01-01 RX ADMIN — LATANOPROST 1 DROP: 50 SOLUTION OPHTHALMIC at 20:19

## 2017-01-01 RX ADMIN — NICARDIPINE HYDROCHLORIDE 7.5 MG/HR: 25 INJECTION INTRAVENOUS at 15:09

## 2017-01-01 RX ADMIN — ONDANSETRON HYDROCHLORIDE 4 MG: 2 SOLUTION INTRAMUSCULAR; INTRAVENOUS at 13:50

## 2017-01-01 RX ADMIN — ACETAMINOPHEN 650 MG: 650 SUPPOSITORY RECTAL at 19:48

## 2017-01-01 RX ADMIN — ACETAMINOPHEN 650 MG: 650 SUPPOSITORY RECTAL at 16:12

## 2017-01-01 RX ADMIN — SODIUM CHLORIDE 50 ML/HR: 9 INJECTION, SOLUTION INTRAVENOUS at 19:24

## 2017-01-01 RX ADMIN — INSULIN LISPRO 6 UNITS: 100 INJECTION, SOLUTION INTRAVENOUS; SUBCUTANEOUS at 12:20

## 2017-01-01 RX ADMIN — HYDRALAZINE HYDROCHLORIDE 10 MG: 20 INJECTION INTRAMUSCULAR; INTRAVENOUS at 06:04

## 2017-01-01 RX ADMIN — BRIMONIDINE TARTRATE 1 DROP: 2 SOLUTION OPHTHALMIC at 20:20

## 2017-01-01 RX ADMIN — BRIMONIDINE TARTRATE 1 DROP: 2 SOLUTION OPHTHALMIC at 10:24

## 2017-01-01 RX ADMIN — DIPHENHYDRAMINE HYDROCHLORIDE 12.5 MG: 50 INJECTION, SOLUTION INTRAMUSCULAR; INTRAVENOUS at 00:05

## 2017-01-01 RX ADMIN — NICARDIPINE HYDROCHLORIDE 10 MG/HR: 25 INJECTION INTRAVENOUS at 17:55

## 2017-01-01 RX ADMIN — BRIMONIDINE TARTRATE 1 DROP: 2 SOLUTION OPHTHALMIC at 16:00

## 2017-01-01 RX ADMIN — MORPHINE SULFATE 4 MG: 4 INJECTION, SOLUTION INTRAMUSCULAR; INTRAVENOUS at 02:56

## 2017-01-01 RX ADMIN — NICARDIPINE HYDROCHLORIDE 10 MG/HR: 25 INJECTION INTRAVENOUS at 20:34

## 2017-01-01 RX ADMIN — INSULIN DETEMIR 20 UNITS: 100 INJECTION, SOLUTION SUBCUTANEOUS at 00:09

## 2017-01-01 RX ADMIN — SODIUM CHLORIDE 50 ML/HR: 9 INJECTION, SOLUTION INTRAVENOUS at 11:07

## 2017-01-01 ASSESSMENT — ENCOUNTER SYMPTOMS
GASTROINTESTINAL NEGATIVE: 1
RESPIRATORY NEGATIVE: 1

## 2017-01-18 PROBLEM — I21.4 NSTEMI (NON-ST ELEVATED MYOCARDIAL INFARCTION) (HCC): Status: ACTIVE | Noted: 2017-01-01

## 2017-01-18 PROBLEM — I63.9 STROKE-IN-EVOLUTION SYNDROME (HCC): Status: ACTIVE | Noted: 2017-01-01

## 2017-01-18 NOTE — CONSULTS
Patient Care Team:  DALILA Boyd as PCP - General (Family Medicine)  No ref. provider found  REASON FOR REFERRAL: elevated troponin, abnormal EKG  Chief complaint: n/v, weakness, speech difficulty     Subjective     Patient is a 71 y.o. male presents with weakness, nausea, vomiting. His wife states she was with the pt at a restaurant around noon when he began to feel ill after they had eaten. She states he bent over and caught himself on the table and made a comment about not feeling well. He had N/V for approximately 25 minutes she states. His speech has been slowed and he is weak and slow to respond. He denies chest pain, dyspnea. Denies a cardiac history aside from a cardiac cath in 1994 with report of no significant CAD at that time. Troponin is 2.55. EKG reveals NSR with first degree AVB, PVC, and anterior lateral t wave inversions. Neurology has seen the patient in consultation due to concern for stroke. CT of the head is negative. His blood pressure is elevated 180s-190s systolic. CTA of the head and neck has been recommended per neurology. There is concern for posterior circulation TIA per neurology.    Review of Systems   Review of Systems   Constitutional: Positive for fatigue. Negative for diaphoresis, fever and unexpected weight change.   HENT: Negative for nosebleeds.    Respiratory: Negative for apnea, cough, chest tightness, shortness of breath and wheezing.    Cardiovascular: Negative for chest pain, palpitations and leg swelling.   Gastrointestinal: Positive for nausea and vomiting. Negative for abdominal distention.   Genitourinary: Negative for hematuria.   Musculoskeletal: Negative for gait problem.   Skin: Negative for color change.   Neurological: Positive for speech difficulty and weakness. Negative for dizziness, syncope and light-headedness.       History  Past Medical History   Diagnosis Date   • Coronary artery disease    • Diabetes mellitus    • Hypertension      Past  Surgical History   Procedure Laterality Date   • Cardiac catheterization       History reviewed. No pertinent family history.  Social History   Substance Use Topics   • Smoking status: Never Smoker   • Smokeless tobacco: None   • Alcohol use No       (Not in a hospital admission)    Current Facility-Administered Medications:   •  atorvastatin (LIPITOR) tablet 40 mg, 40 mg, Oral, Daily, Demi Daniel MD  •  ondansetron (ZOFRAN) 4 MG/2ML injection  - ADS Override Pull, , , ,   •  Insert peripheral IV, , , Once **AND** sodium chloride 0.9 % flush 10 mL, 10 mL, Intravenous, PRN, Demi Daniel MD    Current Outpatient Prescriptions:   •  amLODIPine (NORVASC) 10 MG tablet, Take 1 tablet by mouth daily, Disp: , Rfl:   •  brimonidine (ALPHAGAN) 0.2 % ophthalmic solution, , Disp: , Rfl:   •  dorzolamide-timolol (COSOPT) 22.3-6.8 MG/ML ophthalmic solution, , Disp: , Rfl:   •  fenofibrate 160 MG tablet, Take 1 tablet by mouth daily, Disp: , Rfl:   •  glucose blood (EXACTECH TEST) test strip, Checking blood sugars 4 times a day--DX E11.9, Disp: , Rfl:   •  hydrochlorothiazide (HYDRODIURIL) 25 MG tablet, Take 1 tablet by mouth daily, Disp: , Rfl:   •  insulin aspart (NOVOLOG FLEXPEN) 100 UNIT/ML solution pen-injector sc pen, Inject 20 Units into the skin daily DX:250.00, Disp: , Rfl:   •  insulin detemir (LEVEMIR FLEXTOUCH) 100 UNIT/ML injection, Inject 57 Units into the skin nightly, Disp: , Rfl:   •  Insulin Pen Needle 32G X 4 MM misc, 1 each by Does not apply route daily., Disp: , Rfl:   •  latanoprost (XALATAN) 0.005 % ophthalmic solution, , Disp: , Rfl:   •  lisinopril (PRINIVIL,ZESTRIL) 40 MG tablet, Take 1 tablet by mouth daily, Disp: , Rfl:   •  metFORMIN (GLUCOPHAGE) 1000 MG tablet, Take 1 tablet by mouth 2 times daily (with meals), Disp: , Rfl:   •  tamsulosin (FLOMAX) 0.4 MG capsule 24 hr capsule, TAKE 1 CAPSULE DAILY IN THE MORNING., Disp: , Rfl:   •  aspirin 81 MG tablet, Take 81 mg by mouth daily,  Disp: , Rfl:   •  dorzolamide (TRUSOPT) 2 % ophthalmic solution, Place 2 drops into both eyes 2 times daily., Disp: , Rfl:   •  Potassium Gluconate 595 MG capsule, Take by mouth, Disp: , Rfl:   Allergies:  Review of patient's allergies indicates no known allergies.    Objective     Vital Signs  Temp:  [97.3 °F (36.3 °C)] 97.3 °F (36.3 °C)  Heart Rate:  [74-79] 79  Resp:  [20] 20  BP: (184-194)/(75-91) 184/91    Physical Exam:   Physical Exam   Constitutional: He is oriented to person, place, and time. He appears well-developed and well-nourished. He appears lethargic. He has a sickly appearance. He appears ill. No distress.   HENT:   Head: Normocephalic and atraumatic.   Eyes: Pupils are equal, round, and reactive to light.   Neck: Normal range of motion. Neck supple. No JVD present. No thyromegaly present.   Cardiovascular: Normal rate, regular rhythm, normal heart sounds and intact distal pulses.  Exam reveals no gallop and no friction rub.    No murmur heard.  Pulses:       Dorsalis pedis pulses are 2+ on the right side, and 2+ on the left side.   Pulmonary/Chest: Effort normal and breath sounds normal. No respiratory distress. He has no wheezes. He has no rales. He exhibits no tenderness.   Abdominal: Soft. Bowel sounds are normal. He exhibits no distension. There is no tenderness.   Musculoskeletal: Normal range of motion. He exhibits no edema.   Neurological: He is oriented to person, place, and time. He appears lethargic. No cranial nerve deficit.   Skin: Skin is warm and dry. He is not diaphoretic. There is pallor.   Psychiatric: His speech is delayed. He is slowed.     Results Review:     Lab Results (last 72 hours)     Procedure Component Value Units Date/Time    CBC & Differential [[id number]] Collected:  01/18/17 1403    Specimen:  Blood Updated:  01/18/17 1412    Narrative:       The following orders were created for panel order CBC & Differential.  Procedure                               Abnormality          Status                     ---------                               -----------         ------                     CBC Auto Differential[[id number]]         Abnormal            Final result                 Please view results for these tests on the individual orders.    CBC Auto Differential [[id number]]  (Abnormal) Collected:  01/18/17 1403    Specimen:  Blood Updated:  01/18/17 1412     WBC 9.26 10*3/mm3      RBC 5.05 10*6/mm3      Hemoglobin 14.5 g/dL      Hematocrit 42.9 %      MCV 85.0 fL      MCH 28.7 pg      MCHC 33.8 g/dL      RDW 13.5 %      RDW-SD 41.0 fl      MPV 11.3 fL      Platelets 319 10*3/mm3      Neutrophil % 66.1 %      Lymphocyte % 20.5 %      Monocyte % 9.6 %      Eosinophil % 2.6 %      Basophil % 0.4 %      Immature Grans % 0.8 %      Neutrophils, Absolute 6.12 10*3/mm3      Lymphocytes, Absolute 1.90 10*3/mm3      Monocytes, Absolute 0.89 10*3/mm3      Eosinophils, Absolute 0.24 10*3/mm3      Basophils, Absolute 0.04 10*3/mm3      Immature Grans, Absolute 0.07 (H) 10*3/mm3     Protime-INR [[id number]]  (Normal) Collected:  01/18/17 1403    Specimen:  Blood Updated:  01/18/17 1425     Protime 13.2 Seconds      INR 0.97     aPTT [[id number]]  (Normal) Collected:  01/18/17 1403    Specimen:  Blood Updated:  01/18/17 1425     PTT 29.7 seconds     D-dimer, Quantitative [[id number]]  (Abnormal) Collected:  01/18/17 1403    Specimen:  Blood Updated:  01/18/17 1425     D-Dimer, Quantitative 0.66 (H) mg/L (FEU)     Narrative:       Reference Range is 0-0.50 mg/L FEU. However, results <0.50 mg/L FEU tends to rule out DVT or PE. Results >0.50 mg/L FEU are not useful in predicting absence or presence of DVT or PE.    Comprehensive Metabolic Panel [[id number]]  (Abnormal) Collected:  01/18/17 1403    Specimen:  Blood Updated:  01/18/17 1426     Glucose 262 (H) mg/dL      BUN 17 mg/dL      Creatinine 0.72 mg/dL      Sodium 139 mmol/L      Potassium 3.7 mmol/L      Chloride 101 mmol/L      CO2 26.0 mmol/L       Calcium 9.1 mg/dL      Total Protein 7.7 g/dL      Albumin 4.40 g/dL      ALT (SGPT) 29 U/L      AST (SGOT) 50 (H) U/L      Alkaline Phosphatase 115 U/L      Total Bilirubin 0.7 mg/dL      eGFR Non African Amer 107 mL/min/1.73      Globulin 3.3 gm/dL      A/G Ratio 1.3 g/dL      BUN/Creatinine Ratio 23.6      Anion Gap 12.0 mmol/L     Narrative:       The MDRD GFR formula is only valid for adults with stable renal function between ages 18 and 70.    Amylase [[id number]]  (Normal) Collected:  01/18/17 1403    Specimen:  Blood Updated:  01/18/17 1426     Amylase 64 U/L     Lipase [[id number]]  (Abnormal) Collected:  01/18/17 1403    Specimen:  Blood Updated:  01/18/17 1426     Lipase 276 (H) U/L     Lactic Acid, Plasma [[id number]]  (Normal) Collected:  01/18/17 1403    Specimen:  Blood Updated:  01/18/17 1428     Lactate 1.0 mmol/L     BNP [[id number]]  (Abnormal) Collected:  01/18/17 1403    Specimen:  Blood Updated:  01/18/17 1437     proBNP 1560.0 (H) pg/mL     Urinalysis With / Culture If Indicated [[id number]]  (Abnormal) Collected:  01/18/17 1434    Specimen:  Urine from Urine, Clean Catch Updated:  01/18/17 1450     Color, UA Yellow      Appearance, UA Clear      pH, UA 7.0      Specific Gravity, UA 1.018      Glucose, UA >=1000 mg/dL (3+) (A)      Ketones, UA Negative      Bilirubin, UA Negative      Blood, UA Negative      Protein,  mg/dL (2+) (A)      Leuk Esterase, UA Negative      Nitrite, UA Negative      Urobilinogen, UA 0.2 E.U./dL     Urinalysis, Microscopic Only [[id number]]  (Abnormal) Collected:  01/18/17 1434    Specimen:  Urine from Urine, Clean Catch Updated:  01/18/17 1450     RBC, UA 3-5 (A) /HPF      WBC, UA 0-2 (A) /HPF      Bacteria, UA None Seen /HPF      Squamous Epithelial Cells, UA 0-2 /HPF      Hyaline Casts, UA None Seen /LPF      Methodology Automated Microscopy     POC Troponin, Rapid [[id number]]  (Abnormal) Collected:  01/18/17 1442    Specimen:  Blood Updated:  01/18/17 1455     Troponin  I 2.55 (C) ng/mL       Serial Number: [id number]    : [id number]       Procalcitonin [[id number]]  (Normal) Collected:  01/18/17 1403    Specimen:  Blood Updated:  01/18/17 1500     Procalcitonin <0.25 ng/mL     Narrative:       SIRS, sepsis, severe sepsis, and septic shock are categorized according to the criteria of the consensus conference of the American College of Chest Physicians/Society of Critical Care Medicine.    PCT < 0.5 ng/mL     Systemic infection (sepsis) is not likely.    PCT >0.5 and < 2.0 ng/mL Systemic infection (sepsis) is possible, but other conditions are known to elevate PCT as well.    PCT > 2.0 ng/mL     Systemic infection (sepsis) is likely, unless other causes are known.      PCT > 10.0 ng/mL    Important systemic inflammatory response, almost exclusively due to severe bacterial sepsis or septic shock.    PCT values of < 0.5 ng/mL do not exclude an infection, because localized infections (without systemic signs) may be associated with such low concentrations, or a systemic infection in its initial stages (<6 hours).  Increased PCT can occur without infection.  PCT concentrations between 0.5 and 2.0 ng/mL should be interpreted taking into account the patients history.  It is recommended to retest PCT within 6-24 hours if any concentrations < 2.0 ng/mL are obtained.          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 Concern for primary neurologic event/CVA. Per neurology, CTA head neck, echo, ASA. The patient has been discussed with Dr. Martinez. Further recommendations following his evaluation of the patient.     I discussed the patients findings and my recommendations with patient, family and primary care team.     Maira Cui, APRN  01/18/17  3:50 PM

## 2017-01-18 NOTE — ED PROVIDER NOTES
Subjective  nausea and vomiting, weakness  History of Present Illness  Mr. Bajwa is a 71-year-old white man who was brought in by EMS with complaints of nausea vomiting and hypertension.  Now according to his wife, out eating lunch when he suddenly sat up and began to lean to one side and this was then followed by vomiting.  He states that prior to the vomiting he had a change in his voice.  She said it was very quiet but she does not state his speech was slurred.  Upon arrival in the emergency room the patient was actively  vomiting unable to really provide any history of what had transpired at the restaurant.  He has not had any recent illnesses according to his wife and had been in his usual state of health until this episode occurred.  He does have a history of hypertension diabetes.  Patient denies any symptoms such as chest pain cough fever or chills.    Review of Systems   Unable to perform ROS: Acuity of condition       Past Medical History   Diagnosis Date   • Coronary artery disease    • Diabetes mellitus    • Hypertension        No Known Allergies    Past Surgical History   Procedure Laterality Date   • Cardiac catheterization         History reviewed. No pertinent family history.    Social History     Social History   • Marital status:      Spouse name: N/A   • Number of children: N/A   • Years of education: N/A     Social History Main Topics   • Smoking status: Never Smoker   • Smokeless tobacco: None   • Alcohol use No   • Drug use: No   • Sexual activity: No     Other Topics Concern   • None     Social History Narrative   • None       Prior to Admission medications    Medication Sig Start Date End Date Taking? Authorizing Provider   amLODIPine (NORVASC) 10 MG tablet Take 1 tablet by mouth daily 1/13/17  Yes Historical Provider, MD   brimonidine (ALPHAGAN) 0.2 % ophthalmic solution  10/6/14  Yes Historical Provider, MD   dorzolamide-timolol (COSOPT) 22.3-6.8 MG/ML ophthalmic solution  5/12/16   Yes Historical Provider, MD   fenofibrate 160 MG tablet Take 1 tablet by mouth daily 12/19/16  Yes Historical Provider, MD   glucose blood (EXACTECH TEST) test strip Checking blood sugars 4 times a day--DX E11.9 11/9/15  Yes Historical Provider, MD   hydrochlorothiazide (HYDRODIURIL) 25 MG tablet Take 1 tablet by mouth daily 1/13/17  Yes Historical Provider, MD   insulin aspart (NOVOLOG FLEXPEN) 100 UNIT/ML solution pen-injector sc pen Inject 20 Units into the skin daily DX:250.00 1/13/17  Yes Historical Provider, MD   insulin detemir (LEVEMIR FLEXTOUCH) 100 UNIT/ML injection Inject 57 Units into the skin nightly 1/13/17  Yes Historical Provider, MD   Insulin Pen Needle 32G X 4 MM misc 1 each by Does not apply route daily. 3/4/15  Yes Historical Provider, MD   latanoprost (XALATAN) 0.005 % ophthalmic solution  10/30/14  Yes Historical Provider, MD   lisinopril (PRINIVIL,ZESTRIL) 40 MG tablet Take 1 tablet by mouth daily 1/13/17  Yes Historical Provider, MD   metFORMIN (GLUCOPHAGE) 1000 MG tablet Take 1 tablet by mouth 2 times daily (with meals) 1/13/17  Yes Historical Provider, MD   tamsulosin (FLOMAX) 0.4 MG capsule 24 hr capsule TAKE 1 CAPSULE DAILY IN THE MORNING. 5/2/16  Yes Historical Provider, MD   aspirin 81 MG tablet Take 81 mg by mouth daily    Historical Provider, MD   dorzolamide (TRUSOPT) 2 % ophthalmic solution Place 2 drops into both eyes 2 times daily.    Historical Provider, MD   Potassium Gluconate 595 MG capsule Take by mouth    Historical Provider, MD       Medications   sodium chloride 0.9 % flush 10 mL (not administered)   ondansetron (ZOFRAN) 4 MG/2ML injection  - ADS Override Pull (not administered)   atorvastatin (LIPITOR) tablet 40 mg (not administered)   ondansetron (ZOFRAN) 4 MG/2ML injection  - ADS Override Pull (4 mg  Given 1/18/17 1350)   ondansetron (ZOFRAN) injection 4 mg (4 mg Intravenous Given 1/18/17 1400)   iopamidol (ISOVUE-370) 76 % injection 150 mL (150 mL Intravenous Given  "1/18/17 1611)   promethazine (PHENERGAN) injection 12.5 mg (12.5 mg Intravenous Given 1/18/17 1287)       Objective   Physical Exam   Constitutional: He appears well-developed and well-nourished.   HENT:   Head: Normocephalic and atraumatic.   Right Ear: External ear normal.   Left Ear: External ear normal.   Nose: Nose normal.   Mouth/Throat: Oropharynx is clear and moist.   Eyes: Conjunctivae and EOM are normal. Pupils are equal, round, and reactive to light. Right eye exhibits no discharge. Left eye exhibits no discharge.   Neck: Normal range of motion. Neck supple. No thyromegaly present.   Cardiovascular: Normal rate, regular rhythm, normal heart sounds and intact distal pulses.  Exam reveals no friction rub.    No murmur heard.  Pulmonary/Chest: Effort normal and breath sounds normal. No respiratory distress.   Abdominal: Soft. Bowel sounds are normal. He exhibits no distension. There is no tenderness.   Musculoskeletal: Normal range of motion. He exhibits no edema or deformity.   Neurological: He has normal reflexes. No cranial nerve deficit.   Lethargic  Noted right arm and leg weakness   Skin: Skin is warm and dry. No rash noted.   Psychiatric: Judgment normal.   Nursing note and vitals reviewed.      Procedures         Visit Vitals   • BP (!) 184/91   • Pulse 79   • Temp 97.3 °F (36.3 °C) (Oral)   • Resp 20   • Ht 70\" (177.8 cm)   • Wt 227 lb (103 kg)   • SpO2 91%   • BMI 32.57 kg/m2       Lab Results (last 24 hours)     Procedure Component Value Units Date/Time    CBC & Differential [[id number]] Collected:  01/18/17 1403    Specimen:  Blood Updated:  01/18/17 1412    Narrative:       The following orders were created for panel order CBC & Differential.  Procedure                               Abnormality         Status                     ---------                               -----------         ------                     CBC Auto Differential[[id number]]         Abnormal            Final result             "     Please view results for these tests on the individual orders.    Comprehensive Metabolic Panel [[id number]]  (Abnormal) Collected:  01/18/17 1403    Specimen:  Blood Updated:  01/18/17 1426     Glucose 262 (H) mg/dL      BUN 17 mg/dL      Creatinine 0.72 mg/dL      Sodium 139 mmol/L      Potassium 3.7 mmol/L      Chloride 101 mmol/L      CO2 26.0 mmol/L      Calcium 9.1 mg/dL      Total Protein 7.7 g/dL      Albumin 4.40 g/dL      ALT (SGPT) 29 U/L      AST (SGOT) 50 (H) U/L      Alkaline Phosphatase 115 U/L      Total Bilirubin 0.7 mg/dL      eGFR Non African Amer 107 mL/min/1.73      Globulin 3.3 gm/dL      A/G Ratio 1.3 g/dL      BUN/Creatinine Ratio 23.6      Anion Gap 12.0 mmol/L     Narrative:       The MDRD GFR formula is only valid for adults with stable renal function between ages 18 and 70.    Protime-INR [[id number]]  (Normal) Collected:  01/18/17 1403    Specimen:  Blood Updated:  01/18/17 1425     Protime 13.2 Seconds      INR 0.97     aPTT [[id number]]  (Normal) Collected:  01/18/17 1403    Specimen:  Blood Updated:  01/18/17 1425     PTT 29.7 seconds     D-dimer, Quantitative [[id number]]  (Abnormal) Collected:  01/18/17 1403    Specimen:  Blood Updated:  01/18/17 1425     D-Dimer, Quantitative 0.66 (H) mg/L (FEU)     Narrative:       Reference Range is 0-0.50 mg/L FEU. However, results <0.50 mg/L FEU tends to rule out DVT or PE. Results >0.50 mg/L FEU are not useful in predicting absence or presence of DVT or PE.    BNP [[id number]]  (Abnormal) Collected:  01/18/17 1403    Specimen:  Blood Updated:  01/18/17 1437     proBNP 1560.0 (H) pg/mL     Amylase [[id number]]  (Normal) Collected:  01/18/17 1403    Specimen:  Blood Updated:  01/18/17 1426     Amylase 64 U/L     Lipase [[id number]]  (Abnormal) Collected:  01/18/17 1403    Specimen:  Blood Updated:  01/18/17 1426     Lipase 276 (H) U/L     Procalcitonin [[id number]]  (Normal) Collected:  01/18/17 1403    Specimen:  Blood Updated:  01/18/17 1500      Procalcitonin <0.25 ng/mL     Narrative:       SIRS, sepsis, severe sepsis, and septic shock are categorized according to the criteria of the consensus conference of the American College of Chest Physicians/Society of Critical Care Medicine.    PCT < 0.5 ng/mL     Systemic infection (sepsis) is not likely.    PCT >0.5 and < 2.0 ng/mL Systemic infection (sepsis) is possible, but other conditions are known to elevate PCT as well.    PCT > 2.0 ng/mL     Systemic infection (sepsis) is likely, unless other causes are known.      PCT > 10.0 ng/mL    Important systemic inflammatory response, almost exclusively due to severe bacterial sepsis or septic shock.    PCT values of < 0.5 ng/mL do not exclude an infection, because localized infections (without systemic signs) may be associated with such low concentrations, or a systemic infection in its initial stages (<6 hours).  Increased PCT can occur without infection.  PCT concentrations between 0.5 and 2.0 ng/mL should be interpreted taking into account the patients history.  It is recommended to retest PCT within 6-24 hours if any concentrations < 2.0 ng/mL are obtained.    Lactic Acid, Plasma [[id number]]  (Normal) Collected:  01/18/17 1403    Specimen:  Blood Updated:  01/18/17 1428     Lactate 1.0 mmol/L     CBC Auto Differential [[id number]]  (Abnormal) Collected:  01/18/17 1403    Specimen:  Blood Updated:  01/18/17 1412     WBC 9.26 10*3/mm3      RBC 5.05 10*6/mm3      Hemoglobin 14.5 g/dL      Hematocrit 42.9 %      MCV 85.0 fL      MCH 28.7 pg      MCHC 33.8 g/dL      RDW 13.5 %      RDW-SD 41.0 fl      MPV 11.3 fL      Platelets 319 10*3/mm3      Neutrophil % 66.1 %      Lymphocyte % 20.5 %      Monocyte % 9.6 %      Eosinophil % 2.6 %      Basophil % 0.4 %      Immature Grans % 0.8 %      Neutrophils, Absolute 6.12 10*3/mm3      Lymphocytes, Absolute 1.90 10*3/mm3      Monocytes, Absolute 0.89 10*3/mm3      Eosinophils, Absolute 0.24 10*3/mm3      Basophils, Absolute  0.04 10*3/mm3      Immature Grans, Absolute 0.07 (H) 10*3/mm3     CK [[id number]]  (Normal) Collected:  01/18/17 1403    Specimen:  Blood Updated:  01/18/17 1730     Creatine Kinase 115 U/L     CK-MB [[id number]]  (Normal) Collected:  01/18/17 1403    Specimen:  Blood Updated:  01/18/17 1730     CKMB 2.87 ng/mL     Narrative:       CKMB Index not indicated    Myoglobin, Serum [[id number]]  (Normal) Collected:  01/18/17 1403    Specimen:  Blood Updated:  01/18/17 1725     Myoglobin 26.2 ng/mL     Urinalysis With / Culture If Indicated [[id number]]  (Abnormal) Collected:  01/18/17 1434    Specimen:  Urine from Urine, Clean Catch Updated:  01/18/17 1450     Color, UA Yellow      Appearance, UA Clear      pH, UA 7.0      Specific Gravity, UA 1.018      Glucose, UA >=1000 mg/dL (3+) (A)      Ketones, UA Negative      Bilirubin, UA Negative      Blood, UA Negative      Protein,  mg/dL (2+) (A)      Leuk Esterase, UA Negative      Nitrite, UA Negative      Urobilinogen, UA 0.2 E.U./dL     Urinalysis, Microscopic Only [[id number]]  (Abnormal) Collected:  01/18/17 1434    Specimen:  Urine from Urine, Clean Catch Updated:  01/18/17 1450     RBC, UA 3-5 (A) /HPF      WBC, UA 0-2 (A) /HPF      Bacteria, UA None Seen /HPF      Squamous Epithelial Cells, UA 0-2 /HPF      Hyaline Casts, UA None Seen /LPF      Methodology Automated Microscopy     POC Troponin, Rapid [[id number]]  (Abnormal) Collected:  01/18/17 1442    Specimen:  Blood Updated:  01/18/17 1455     Troponin I 2.55 (C) ng/mL       Serial Number: [id number]    : [id number]       POC Glucose Fingerstick [[id number]]  (Abnormal) Collected:  01/18/17 1731    Specimen:  Blood Updated:  01/18/17 1743     Glucose 189 (H) mg/dL       : [id number] Sorenson Beht Meter ID: [id number]       POC Troponin, Rapid [[id number]]  (Abnormal) Collected:  01/18/17 1731    Specimen:  Blood Updated:  01/18/17 1745     Troponin I 1.94 (C) ng/mL       Serial Number: [id number]    : [id number]              CT Angiogram Chest With Contrast   Final Result   1. No evidence of pulmonary embolus or other acute cardiopulmonary   process.   2. Coronary artery calcifications are noted.   3. Mild cardiomegaly and LEFT ventricular hypertrophy.   4. Findings in the upper abdomen are described in a separate dictation.       Electronically Signed By-Dr. Jacoby Ambrocio MD On:1/18/2017 5:27 PM   EST   This report was finalized on 01/18/2017 16:27 by Dr. Jacoby Ambrocio MD.      CT Abdomen Pelvis With Contrast   Final Result   1. Small area of splenic infarction. This is likely acute.   2. Bilateral renal stones without evidence of obstructive urolithiasis.   3. Mild prominence of the ureters could be due to bladder distention and   prostatomegaly.   4. Mild constipation.        Electronically Signed By-Dr. Jacoby Ambrocio MD On:1/18/2017 5:22 PM   EST   This report was finalized on 01/18/2017 16:22 by Dr. Jacoby Ambrocio MD.      XR Chest 1 View   Final Result      CT Head Without Contrast   Final Result              ED Course  ED Course   Comment By Time   Upon my arrival at the bedside the patient was actively vomiting.  Once the vomiting.  He was quite lethargic.  It did seem to follow some commands and he had some noted right-sided weakness.  A code stroke was called and a CT was obtained.  Dr. Goodman, who is the neurologist, did come and see the patient.  She felt that there was overall clinical improvement and elected not to thrombolyze the patient.  I agree that there was considerable improvement in his symptoms.  At this point his positive labs included troponin of 2.55.  There is no acute MI noted on his EKG I do not see any ST segment elevations.  The patient does not have any complaints of chest pain.  Allergy has evaluated the patient.  In view of the potential neurologic changes they would prefer at this point that the hospitalist admit the patient.  Cardiology has evaluated the patient.  In view of the  potential neurologic findings they would prefer that the hospitalist admit the patient at this time.  I have spoken with Dr. Tigre Daniel MD 01/18 1864          MDM  Number of Diagnoses or Management Options  Cerebrovascular accident (CVA), unspecified mechanism: new and requires workup  NSTEMI (non-ST elevated myocardial infarction): new and requires workup     Amount and/or Complexity of Data Reviewed  Clinical lab tests: ordered and reviewed  Tests in the radiology section of CPT®: ordered and reviewed  Obtain history from someone other than the patient: yes  Discuss the patient with other providers: yes    Risk of Complications, Morbidity, and/or Mortality  Presenting problems: high  Diagnostic procedures: high  Management options: high    Patient Progress  Patient progress: stable      Differential diagnosis included but was not limited to acute mental status change: CVA,  Drugs, metabolic abnormalities, infectious, structural lesion  Or vascular. All labs and imaging results were reviewed by Demi Daniel MD    Final diagnoses:   NSTEMI (non-ST elevated myocardial infarction)   Cerebrovascular accident (CVA), unspecified mechanism        Demi Daniel MD  01/29/17 2117

## 2017-01-18 NOTE — CONSULTS
"Neurology Consult Note    Referring Provider Dr. Daniel  Reason for Consultation: possible stroke  History of present illness:    The patient is a 71 year old man who was out eating lunch with his wife when he became pale and nauseated and then threw up. Symptoms started between 12:15 to 12:30 pm.  He reports no headache or vertigo or change in vision.    Upon arrival at ED, the patient was noted to have some difficulty with speech and leg weakness.     Code stroke was called and the patient underwent stat head CT. He was examined after return from scanner at 2 pm.    Past Medical History  No history of stroke    Past Surgical History  Cardiac cath 20 years ago    No Known Allergies    Hospital Medications (all)       Dose Frequency Start End    ondansetron (ZOFRAN) 4 MG/2ML injection  - ADS Override Pull   1/18/2017 1/18/2017    Notes to Pharmacy: Created by cabinet override    ondansetron (ZOFRAN) 4 MG/2ML injection  - ADS Override Pull   1/18/2017 1/19/2017    Notes to Pharmacy: Created by cabinet override    ondansetron (ZOFRAN) injection 4 mg 4 mg Once 1/18/2017     Sig - Route: Infuse 2 mL into a venous catheter 1 (One) Time. - Intravenous    Cosign for Ordering: Required by Demi Daniel MD    sodium chloride 0.9 % flush 10 mL 10 mL As Needed 1/18/2017     Sig - Route: Infuse 10 mL into a venous catheter As Needed for line care. - Intravenous    Linked Group 1:  \"And\" Linked Group Details            Social History  , retired , does not use tobacco    Review of Systems  A 14 point review of systems was reviewed and was negative except for nausea    Vital Signs   Temp:  [97.3 °F (36.3 °C)] 97.3 °F (36.3 °C)  Heart Rate:  [74] 74  Resp:  [20] 20  BP: (184)/(75) 184/75    General Exam:  HEENT:  Atraumatic, no rash  Musculoskeletal:  Neck supple  CV:  Regular rate and rhythm.  No murmurs  Carotid Examination:  No bruits  Lungs:  Clear to auscultation  Extremities:  No signs of " peripheral edema  Skin:  No rashes, no bruises, normal turgor    Neurologic Exam:  Alert, oriented  Fluent, able to repeat, name, read  No dysarthria  EOMF and with no nystagmus  Visual fields intact  Face symmetric  Power and coordination intact  Reflexes symmetric  Plantar response flexor    NIHSS  0    Results Review:    Results from last 7 days  Lab Units 01/18/17  1403   WBC 10*3/mm3 9.26   HEMOGLOBIN g/dL 14.5   HEMATOCRIT % 42.9   PLATELETS 10*3/mm3 319       Results from last 7 days  Lab Units 01/18/17  1403   SODIUM mmol/L 139   POTASSIUM mmol/L 3.7   CHLORIDE mmol/L 101   TOTAL CO2 mmol/L 26.0   BUN mg/dL 17   CREATININE mg/dL 0.72   CALCIUM mg/dL 9.1   BILIRUBIN mg/dL 0.7   ALK PHOS U/L 115   ALT (SGPT) U/L 29   AST (SGOT) U/L 50*   GLUCOSE mg/dL 262*      No results found for: AMMONIA, MAGNESIUM  No results found for: [id number] results found for: TSH    Imaging Results (last 24 hours)     Procedure Component Value Units Date/Time    CT Head Without Contrast [[id number]] Collected:  01/18/17 1409     Updated:  01/18/17 1413    Narrative:       EXAMINATION: CT HEAD WO CONTRAST-      1/18/2017 2:00 PM CST     HISTORY: stroke symptoms     In order to have a CT radiation dose as low as reasonably achievable  Automated Exposure Control was utilized for adjustment of the mA and/or  KV according to patient size.     DLP in mGycm= 776.     Code stroke protocol noncontrast head CT.     Mucosal thickening with opacification of the left side of the sphenoid  sinus.  Trace amount of air in the left cavernous sinus region is indeterminate.     Ventricle size is appropriate.     There is no acute hemorrhage. No cortical infarct is seen.  Mild periventricular white matter small vessel disease.     Summary:  1. No acute intracranial abnormality.  2. Negative head CT report called to Dr. Daniel in the emergency  room at 2:09 PM                       Electronically Signed By-Dr. Uche Major MD On:1/18/2017 3:11 PM  EST  This report was finalized on 01/18/2017 14:11 by Dr. Uche Major MD.    XR Chest 1 View [[id number]] Collected:  01/18/17 1459     Updated:  01/18/17 1502    Narrative:       EXAMINATION: XR CHEST 1 VW-     1/18/2017 2:31 PM CST     HISTORY: mental status change     1 view chest x-ray with no comparison.     Limited visualization of the retrocardiac left lower lobe.     Magnified heart size.     The visualized portion of the lungs show no focal infiltrate and no  pneumothorax.     Electronically Signed By-Dr. Uche Major MD On:1/18/2017 4:00 PM EST  This report was finalized on 01/18/2017 15:00 by Dr. Uche Major MD.        Impression    Nausea and emesis with normal neurological exam at this time.  No indication for intravenous thrombolytic therapy  May have been a posterior circulation TIA  Also consider primary GI etiology    Recommendations  Admit for further evaluation  Aspirin  Frequent neurochecks per protocol  CT angiogram head/neck  Echocardiogram.    I discussed the patients findings and my recommendations with patient's wife and ED physician    Yadira Goodman MD  01/18/17  3:07 PM

## 2017-01-19 NOTE — PLAN OF CARE
Problem: Patient Care Overview (Adult)  Goal: Plan of Care Review  Outcome: Ongoing (interventions implemented as appropriate)  Goal: Adult Individualization and Mutuality  Outcome: Ongoing (interventions implemented as appropriate)  Goal: Discharge Needs Assessment  Outcome: Ongoing (interventions implemented as appropriate)    Problem: Acute Coronary Syndrome (ACS) (Adult)  Goal: Signs and Symptoms of Listed Potential Problems Will be Absent or Manageable (Acute Coronary Syndrome)  Outcome: Ongoing (interventions implemented as appropriate)    Problem: Stroke (Ischemic) (Adult)  Goal: Signs and Symptoms of Listed Potential Problems Will be Absent or Manageable (Stroke)  Outcome: Ongoing (interventions implemented as appropriate)

## 2017-01-19 NOTE — PLAN OF CARE
Problem: Patient Care Overview (Adult)  Goal: Plan of Care Review  Outcome: Ongoing (interventions implemented as appropriate)    01/19/17 0821   Coping/Psychosocial Response Interventions   Plan Of Care Reviewed With patient;spouse   Outcome Evaluation   Outcome Summary/Follow up Plan PT eval completed. Pt. required Min A-CGA x1-2 for bed mobility, Min A x2 for sit/stand and gait ~3 ft. x2. Pt. tolerated well sitting upright on EOB and BSC. Pt. demonstrated mild coordination deficits and gross strength deficit in B hip flexion. PT will continue working with pt. to improve balance, strength,and coordination. Recommend anticipated D/C to home with assist or with home health pending progress.         Problem: Inpatient Physical Therapy  Goal: Bed Mobility Goal LTG- PT  Outcome: Ongoing (interventions implemented as appropriate)    01/19/17 0821   Bed Mobility PT LTG   Bed Mobility PT LTG, Date Established 01/19/17   Bed Mobility PT LTG, Time to Achieve by discharge   Bed Mobility PT LTG, Activity Type all bed mobility   Bed Mobility PT LTG, [place] Level independent   Bed Mobility PT Goal LTG, Assist Device bed rails       Goal: Transfer Training Goal 1 LTG- PT  Outcome: Ongoing (interventions implemented as appropriate)    01/19/17 0821   Transfer Training PT LTG   Transfer Training PT LTG, Date Established 01/19/17   Transfer Training PT LTG, Time to Achieve by discharge   Transfer Training PT LTG, Activity Type bed to chair /chair to bed;sit to stand/stand to sit   Transfer Training PT LTG, [place] Level conditional independence   Transfer Training PT LTG, Assist Device walker, rolling       Goal: Gait Training Goal LTG- PT  Outcome: Ongoing (interventions implemented as appropriate)    01/19/17 0821   Gait Training PT LTG   Gait Training Goal PT LTG, Date Established 01/19/17   Gait Training Goal PT LTG, Time to Achieve by discharge   Gait Training Goal PT LTG, [place] Level supervision required    Gait Training Goal PT LTG, Assist Device walker, rolling   Gait Training Goal PT LTG, Distance to Achieve 150       Goal: Dynamic Standing Balance Goal LTG- PT  Outcome: Ongoing (interventions implemented as appropriate)    01/19/17 0821   Dynamic Standing Balance PT LTG   Dynamic Standing Balance PT LTG, Date Established 01/19/17   Dynamic Standing Balance PT LTG, Time to Achieve by discharge   Dynamic Standing Balance PT LTG, [place] Level supervision required;contact guard assist   Dynamic Standing Balance PT LTG, Additional Goal Standing balance activities of head movements and LE pre-gait activities such as multi-directional weight shifts and marching x10 reps to improve strength and independence

## 2017-01-19 NOTE — PROGRESS NOTES
Discharge Planning Assessment  [facility]     Patient Name: Ranjeet Bajwa  MRN: [id number]  Today's Date: 1/19/2017    Admit Date: 1/18/2017          Discharge Needs Assessment       01/19/17 6349    Living Environment    Provides Primary Care For no one    Able to Return to Prior Living Arrangements yes    Discharge Needs Assessment    Discharge Planning Comments Spoke with patient's wife regarding discharge plan/needs.  Wife states she is unsure that patient will need HH.  She states patient is doing well and may return to baseline and HH may not be necessary.  Home oxygen is listed in DME section and wife states patient does not have home oxygen.  Wife states they do have some walkers that previous family members had used.  Wife is unsure if these walkers will be tall enough for patient.  Ensured wife we can arrange whatever DME patient will need.  Will follow patient's progress to determine needs upon discharge.  Patient will home go home with spouse and possibly HH.            Discharge Plan     None        Discharge Placement     No information found                Demographic Summary     None            Functional Status     None            Psychosocial     None            Abuse/Neglect     None            Legal     None            Substance Abuse     None            Patient Forms     None          JESSICA Valladares

## 2017-01-19 NOTE — PLAN OF CARE
Problem: Patient Care Overview (Adult)  Goal: Plan of Care Review  Outcome: Ongoing (interventions implemented as appropriate)    01/19/17 1431   Coping/Psychosocial Response Interventions   Plan Of Care Reviewed With patient;spouse   Patient Care Overview   Progress progress toward functional goals as expected   Outcome Evaluation   Outcome Summary/Follow up Plan OT initial eval completed. Pt limited by nausea. Pt required CGA/min A for all mobility tasks. Pt required min A for UB dressing and MOD a to chuyita/doff socks. Pt gross motor coordination was mildly impaired. Skilled OT is recommended to address t/f, bed mobility, adls, activity tolerance, and gross motor coordination.Recommended d/c emma with assist .         Problem: Inpatient Occupational Therapy  Goal: Transfer Training Goal 1 LTG- OT  Outcome: Ongoing (interventions implemented as appropriate)    01/19/17 1431   Transfer Training OT LTG   Transfer Training OT LTG, Date Established 01/19/17   Transfer Training OT LTG, Time to Achieve by discharge   Transfer Training OT LTG, Activity Type all transfers   Transfer Training OT LTG, [place] Level conditional independence   Transfer Training OT LTG, Assist Device walker, rolling       Goal: Grooming Goal LTG- OT  Outcome: Ongoing (interventions implemented as appropriate)    01/19/17 1431   Grooming OT LTG   Grooming Goal OT LTG, Date Established 01/19/17   Grooming Goal OT LTG, Time to Achieve by discharge   Grooming Goal OT LTG, [place] Level independent   Grooming Goal OT LTG, Position standing       Goal: Toileting Goal LTG- OT  Outcome: Ongoing (interventions implemented as appropriate)    01/19/17 1431   Toileting OT LTG   Toileting Goal OT LTG, Date Established 01/19/17   Toileting Goal OT LTG, Time to Achieve by discharge   Toileting Goal OT LTG, [place] Level independent       Goal: LB Dressing Goal LTG- OT  Outcome: Ongoing (interventions implemented as appropriate)    01/19/17 1431    LB Dressing OT LTG   LB Dressing Goal OT LTG, Date Established 01/19/17   LB Dressing Goal OT LTG, Time to Achieve by discharge   LB Dressing Goal OT LTG, [place] Level conditional independence   LB Dressing Goal OT LTG, Adaptive Equipment reacher;shoe horn, long handled;sock-aid  (AE PRN)       Goal: Coordination Goal LTG- OT  Outcome: Ongoing (interventions implemented as appropriate)    01/19/17 1437   Coordination OT LTG   Coordination OT LTG, Date Established 01/19/17   Coordination OT LTG, Time to Achieve by discharge   Coordination OT LTG, Activity Type GM task   Coordination OT LTG, [place] Level independent

## 2017-01-19 NOTE — PROGRESS NOTES
"    [facility] Medicine Services  INPATIENT PROGRESS NOTE    Length of Stay: 1  Date of Admission: 1/18/2017  Primary Care Physician: DALILA Spencer    Subjective   Chief Complaint: \"I feel a little better\"  HPI   Patient reports that he feels a little bit better today.  He sat up in bed to begin working with speech therapy, and then became nauseated but did not vomit.  He denies any headache.  He reports no current vision difficulties.  He reports no motor deficits in his extremities.  He denies chest pain or shortness of breath.  He currently is expressing no abdominal pain.    Review of Systems     All pertinent negatives and positives are as above. All other systems have been reviewed and are negative unless otherwise stated.     Objective    Temp:  [97.2 °F (36.2 °C)-97.5 °F (36.4 °C)] 97.4 °F (36.3 °C)  Heart Rate:  [] 89  Resp:  [15-22] 18  BP: (114-195)/(51-98) 165/67  Physical Exam   Constitutional: He is oriented to person, place, and time. He appears well-developed and well-nourished.   HENT:   Head: Normocephalic and atraumatic.   Mouth/Throat: No oropharyngeal exudate.   Eyes: Pupils are equal, round, and reactive to light. No scleral icterus.   Neck: Normal range of motion. No tracheal deviation present.   Cardiovascular: Normal rate and regular rhythm.    Pulmonary/Chest: Effort normal and breath sounds normal.   Abdominal: Soft. Bowel sounds are normal. There is no tenderness (a little protuberant).   Musculoskeletal: He exhibits no edema.   Neurological: He is alert and oriented to person, place, and time. No cranial nerve deficit.   Abnormal finger-to-nose and ROM testing involving bilateral upper extremities   Vitals reviewed.    Results Review:  I have reviewed the labs, radiology results, and diagnostic studies.    Laboratory Data:     Results from last 7 days  Lab Units 01/19/17  0330 01/18/17 1954 01/18/17  1403   WBC 10*3/mm3 11.79* 12.26* " 9.26   HEMOGLOBIN g/dL 13.6* 14.4 14.5   HEMATOCRIT % 38.9* 41.3 42.9   PLATELETS 10*3/mm3 322 327 319          Results from last 7 days  Lab Units 01/19/17  0330 01/18/17 1954 01/18/17  1403   SODIUM mmol/L 140 136 139   POTASSIUM mmol/L 3.5 3.5 3.7   CHLORIDE mmol/L 103 100 101   TOTAL CO2 mmol/L 25.0 22.0* 26.0   BUN mg/dL 14 15 17   CREATININE mg/dL 0.73 0.67 0.72   CALCIUM mg/dL 8.6 8.8 9.1   BILIRUBIN mg/dL 0.5 0.8 0.7   ALK PHOS U/L 64 92 115   ALT (SGPT) U/L 32 30 29   AST (SGOT) U/L 28 39 50*   GLUCOSE mg/dL 281* 284* 262*       Culture Data:        Radiology Data:   Imaging Results (last 24 hours)     Procedure Component Value Units Date/Time    CT Head Without Contrast [[id number]] Collected:  01/18/17 1409     Updated:  01/18/17 1413    Narrative:       EXAMINATION: CT HEAD WO CONTRAST-      1/18/2017 2:00 PM CST     HISTORY: stroke symptoms     In order to have a CT radiation dose as low as reasonably achievable  Automated Exposure Control was utilized for adjustment of the mA and/or  KV according to patient size.     DLP in mGycm= 776.     Code stroke protocol noncontrast head CT.     Mucosal thickening with opacification of the left side of the sphenoid  sinus.  Trace amount of air in the left cavernous sinus region is indeterminate.     Ventricle size is appropriate.     There is no acute hemorrhage. No cortical infarct is seen.  Mild periventricular white matter small vessel disease.     Summary:  1. No acute intracranial abnormality.  2. Negative head CT report called to Dr. Daniel in the emergency  room at 2:09 PM                       Electronically Signed By-Dr. Uche Major MD On:1/18/2017 3:11 PM EST  This report was finalized on 01/18/2017 14:11 by Dr. Uche Major MD.    XR Chest 1 View [[id number]] Collected:  01/18/17 1459     Updated:  01/18/17 1502    Narrative:       EXAMINATION: XR CHEST 1 VW-     1/18/2017 2:31 PM CST     HISTORY: mental status change     1 view chest x-ray with no  comparison.     Limited visualization of the retrocardiac left lower lobe.     Magnified heart size.     The visualized portion of the lungs show no focal infiltrate and no  pneumothorax.     Electronically Signed By-Dr. Uche Major MD On:1/18/2017 4:00 PM EST  This report was finalized on 01/18/2017 15:00 by Dr. Uche Major MD.    CT Abdomen Pelvis With Contrast [[id number]] Collected:  01/18/17 1619     Updated:  01/18/17 1627    Narrative:       CT ABDOMEN PELVIS W CONTRAST- 1/18/2017 16:00 CST     HISTORY: protracted vomiting       COMPARISON: None.      DOSE LENGTH PRODUCT: 845 mGy cm. Automated exposure control was also  utilized to decrease patient radiation dose.     TECHNIQUE: Following the intravenous administration of contrast, helical  CT tomographic images of the abdomen and pelvis were acquired. Coronal  reformatted images were also provided for review.      FINDINGS:   Findings in the lower chest are described in a separate dictation.      LIVER: No focal liver lesion. The hepatic vasculature is patent.      BILIARY SYSTEM: Multiple stones are seen in the gallbladder. There is no  biliary ductal dilatation or acute cholecystitis.      PANCREAS: No focal pancreatic lesion.      SPLEEN: A well-defined region of hypoenhancement in the splenic body is  noted. This is likely due to a splenic infarct.      KIDNEYS AND ADRENALS: Bilateral renal stones are present. The ureters  are prominent, but no obstructing stones are seen in the ureters. There  is no hydronephrosis. The adrenal glands are normal in appearance. Mild  bilateral perinephric stranding is present.     RETROPERITONEUM: No mass, lymphadenopathy or hemorrhage.      GI TRACT: A moderate-sized duodenal diverticulum is noted. A moderate  amount of stool is seen throughout the sigmoid colon, and the stomach is  mildly distended. The appendix is not visualized.     OTHER: There is no mesenteric mass, lymphadenopathy or fluid collection.  The  abdominopelvic vasculature is patent. The osseous structures and  soft tissues demonstrate no worrisome lesions.          PELVIS: Bilateral fat-containing inguinal hernias are present. The  bladder is mildly dilated. The prostate is moderately enlarged.       Impression:       1. Small area of splenic infarction. This is likely acute.  2. Bilateral renal stones without evidence of obstructive urolithiasis.  3. Mild prominence of the ureters could be due to bladder distention and  prostatomegaly.  4. Mild constipation.      Electronically Signed By-Dr. Jacoby Ambrocio MD On:1/18/2017 5:22 PM  EST  This report was finalized on 01/18/2017 16:22 by Dr. Jacoby Ambrocio MD.    CT Angiogram Chest With Contrast [[id number]] Collected:  01/18/17 1625     Updated:  01/18/17 1630    Narrative:       CT ANGIOGRAM CHEST W CONTRAST- 1/18/2017 16:00 CST      HISTORY: Elevated d-dimer and nausea and vomiting      COMPARISON: None.      DOSE LENGTH PRODUCT: 481 mGy cm. Automated exposure control was also  utilized to decrease patient radiation dose.     TECHNIQUE: Helical tomographic images of the chest were obtained after  the administration of intravenous contrast following angiogram protocol.  Additionally, 3D reformatted images were provided.        FINDINGS:    Pulmonary arteries: There is adequate enhancement of the pulmonary  arteries to evaluate for central and segmental pulmonary emboli. There  are no filling defects within the main, lobar, segmental or visualized  subsegmental pulmonary arteries. The pulmonary arteries are within  normal limits for size.      Aorta and great vessels: The aorta is well opacified and demonstrates no  dissection or aneurysm. The great vessels are normal in appearance.  Coronary artery calcifications are present.     Visualized neck base: The imaged portion of the base of the neck appears  unremarkable.      Lungs: Dependent changes are noted in bilateral lung bases. The lungs  are otherwise  clear. There is no pleural effusion. The trachea and  bronchial tree are patent.      Heart: The heart is enlarged, and there is hypertrophy of the LEFT  ventricular wall. There is no pericardial effusion.      Mediastinum and lymph nodes: No enlarged mediastinal, hilar, or axillary  lymph nodes are present.      Skeletal and soft tissues: The osseous structures of the thorax and  surrounding soft tissues demonstrate no acute process.     Upper abdomen: Findings in the upper abdomen are described in a separate  dictation.        Impression:       1. No evidence of pulmonary embolus or other acute cardiopulmonary  process.  2. Coronary artery calcifications are noted.  3. Mild cardiomegaly and LEFT ventricular hypertrophy.  4. Findings in the upper abdomen are described in a separate dictation.     Electronically Signed By-Dr. Jacoby Ambrocio MD On:1/18/2017 5:27 PM  EST  This report was finalized on 01/18/2017 16:27 by Dr. Jacoby Ambrocio MD.    MRI Brain Without Contrast [[id number]] Collected:  01/18/17 2052     Updated:  01/18/17 2102    Narrative:       EXAMINATION:  MRI BRAIN WO CONTRAST-  1/18/2017 8:36 PM CST     HISTORY: Altered mental status.     TECHNIQUE: Multiplanar imaging was performed in a high field magnet.     COMPARISON: No comparison study.     FINDINGS: There are acute bilateral cerebellar infarcts. The infarcts  are greater on the right side. There is bilateral PICA distribution  infarct and on the right side there is also infarct that involves the  more superior and medial aspect of the cerebellum. There is diffusion  restriction on the ADC map images. There is edema in these areas on the  T2-weighted images seen best on the FLAIR sequence. There are no  cerebral hemispheric acute infarcts. There are scattered areas of T2  high signal within both cerebellar hemispheres that is nonspecific and  most likely due to chronic small vessel disease. There is a mild degree  of atrophy with associated  ventricular prominence. There are normal flow  voids in both vertebral arteries and the basilar artery.       Impression:       1. Acute bilateral cerebellar infarcts that are fairly large in size.  2. Chronic ischemic white matter disease involving the cerebral  hemispheres.  3. Mild atrophy.  4. Mucosal inflammatory changes involving the paranasal sinuses.     The full report of this exam was immediately signed and available to the  emergency room. The patient is currently in the emergency room.     Electronically Signed By-Dr. Neil Chatman MD On:1/18/2017 10:00 PM EST  This report was finalized on 01/18/2017 21:00 by Dr. Neil Chatman MD.          I have reviewed the patient current medications.     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 Acute bilateral cerebellar infarcts  2. Elevated troponin - could be 2/2 acute CVA  3. Nausea and vomiting  4. DMII - insulin dependent  5. HTN - poorly controlled  6. Urinary Retention - >1000ml after catheterization in ED  7. Splenic Infarct on CT A/P  8. Constipation - mild per CT  9. LENA  10.  Elevated lipase    Plan:  1.  Appreciate Neuro input  2.  CTA Head and Neck  3.  Echo today  4.  Permissive HTN in setting of acute CVA; IV Labetalol PRN per stroke parameters  5.  NPO pending ST eval today  6.  ASA/statin; patient was on antiplatelet tx with ASA at home (should we consider Plavix or Aggrenox?; appreciate Neuro input).  7.  Lovenox at PPx dose  8.  Titrate Levemir given hyperglycemia (taking into consideration that patient currently NPO); SSI coverage; monitor BSs closely  9.  Thomason placed for urinary retention (>1000ml); on Flomax  10. PT/OT  11.  IV Anti-emetics PRN  12.  Workup continues    Kody Tellez MD   01/19/17   8:21 AM

## 2017-01-19 NOTE — PROGRESS NOTES
Acute Care - Occupational Therapy Initial Evaluation  [facility]     Patient Name: Ranjeet Bajwa  : 1945  MRN: [id number]  Today's Date: 2017  Onset of Illness/Injury or Date of Surgery Date: 17  Date of Referral to OT: 17  Referring Physician: Dr. Tellez    Admit Date: 2017       ICD-10-CM ICD-9-CM   1. NSTEMI (non-ST elevated myocardial infarction) I21.4 410.70   2. Cerebrovascular accident (CVA), unspecified mechanism I63.9 434.91   3. Oropharyngeal dysphagia R13.12 787.22   4. Impaired functional mobility, balance, and endurance Z74.09 V49.89   5. 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   • NSTEMI (non-ST elevated myocardial infarction)     Past Medical History   Diagnosis Date   • Coronary artery disease    • Diabetes mellitus    • Hypertension      Past Surgical History   Procedure Laterality Date   • Cardiac catheterization            OT ASSESSMENT FLOWSHEET (last 72 hours)      OT Evaluation       17 0835 17 0830 17 0821 17 0638 17 0200    Rehab Evaluation    Document Type evaluation  -MM evaluation  -KW (r) LH (t) KW (c) evaluation   See MAR  -REHANA (r) SM (t) REHANA (c)      Subjective Information agree to therapy;complains of;fatigue;nausea/vomiting;dizziness   no nausea/dizziness intiailly, only with movement  -MM complains of;nausea/vomiting  -KW (r) LH (t) KW (c) agree to therapy;complains of;fatigue  -REHANA (r) SM (t) REHANA (c)      Symptoms Noted During/After Treatment  none  -KW (r) LH (t) KW (c) shortness of breath   Pt. reported SOB when first sitting EOB  -REHANA (r) SM (t) REHANA (c)      Symptoms Noted Comment  Pt was nauseated when head was first elevated.  -KW (r) LH (t) KW (c) Pt. vomited after returning to bed  -REHANA (r) SM (t) REHANA (c)      General Information    Patient Profile Review yes  -MM  yes  -REHANA (r) SM (t) REHANA (c)      Onset of Illness/Injury or Date of Surgery Date  01/18/17  -MM  01/18/17  -REHANA (r) SM (t) REHANA (c)      Referring Physician Dr. Tellez  -MM  Dr. Tellez   stroke patient  -REHANA (r) SM (t) REHANA (c)      General Observations pt in fowlers, spouse present, blood pressure cuff, SpO2 monitor, telemetry, IV, hollins  -MM  Fowlers in bed, alert, wife present  -REHANA (r) SM (t) REHANA (c)      Pertinent History Of Current Problem   CC: nausea/vomiting, abnormal speech, weakness. Dx: Possible TIA vs. CVA, elevated troponin & possible NSTEMI, DM 2. 1/18 MRI brain: Acute bilateral cerebellar infarcts that are fairly large in size. Chronic ischemic white matter disease involving cerebral hemispheres. Mild atrophy. Mucosal inflammatory changes involving paranasal sinuses. 1/18 CT angiogram chest: Coronary artery calcifications are noted. 1/18 CT abdomen/pelvis: smal area of splenic infarction (likely acute). 1/18 CXR. 1/18 CT head.  -REHANA (r) SM (t) REHANA (c)      Precautions/Limitations fall precautions  -MM  fall precautions  -REHANA (r) SM (t) REHANA (c)      Prior Level of Function independent:;all household mobility;community mobility;feeding;grooming;dressing;bathing;cooking;cleaning;driving  -MM  independent:;all household mobility;community mobility;gait;transfer;ADL's;feeding;bathing;dressing;cleaning;driving  -REHANA (r) SM (t) REHANA (c)      Equipment Currently Used at Home oxygen;crutches, axillary;cane, straight;walker, standard   CPAP at night  -MM  cane, straight;crutches;walker, standard;oxygen   CPAP at night  -REHANA (r) SM (t) REHANA (c) none  -CI     Plans/Goals Discussed With patient and family;agreed upon  -MM  patient;spouse/S.O.;agreed upon  -REHANA (r) SM (t) REHANA (c)      Risks Reviewed patient and family:;LOB;nausea/vomiting;dizziness;increased discomfort;change in vital signs;increased drainage;lines disloged  -MM  patient and family:;LOB;nausea/vomiting;dizziness;increased discomfort;change in vital signs  -REHANA (r) SM (t) REHANA (c)      Benefits Reviewed patient and family:;improve  function;increase independence;increase strength;increase balance;decrease pain;decrease risk of DVT;improve skin integrity;increase knowledge  -MM  patient and family:;improve function;increase independence;increase strength;increase balance;increase knowledge;improve skin integrity;decrease pain  -REHANA      Barriers to Rehab   medically complex  -REHANA      Living Environment    Lives With spouse  -MM  spouse  -REHANA (r) SM (t) REHANA (c)      Living Arrangements house  -MM  house  -REHANA (r) SM (t) REHANA (c)      Home Accessibility grab bars present (bathtub);grab bars present (toilet);stairs to enter home;bed and bath on same level   walk in shower  -MM  bed and bath on same level;stairs to enter home;grab bars present (bathtub);grab bars present (toilet)  -REHANA (r) SM (t) REHANA (c)      Number of Stairs to Enter Home 2  -MM  2  -REHANA (r) SM (t) REHANA (c)      Stair Railings at Home none  -MM  none  -REHANA (r) SM (t) REHANA (c)      Transportation Available car;family or friend will provide  -MM   family or friend will provide  -CI     Clinical Impression    Date of Referral to OT 01/18/17  -MM        OT Diagnosis impaired mobility and ADLs  -MM        Impairments Found (describe specific impairments) ergonomics and body mechanics;gait, locomotion, and balance  -MM        Patient/Family Goals Statement return home  -MM        Criteria for Skilled Therapeutic Interventions Met yes;treatment indicated  -MM        Rehab Potential good, to achieve stated therapy goals  -MM        Therapy Frequency 3-5 times/wk  -MM        Predicted Duration of Therapy Intervention (days/wks) until discharge  -MM        Anticipated Equipment Needs At Discharge --   hip kit  -MM        Anticipated Discharge Disposition home with assist;home with home health  -MM        Vital Signs    Pre Systolic BP Rehab 177  -MM  177  -REHANA (r) SM (t) REHANA (c)      Pre Treatment Diastolic BP 71  -MM  71  -REHANA (r) SM (t) REHANA (c)      Post Systolic BP Rehab 195  -MM  195  -REHANA (r) SM (t) REHANA  (c)      Post Treatment Diastolic BP 75  -MM  75  -REHANA (r) SM (t) REHANA (c)      Pretreatment Heart Rate (beats/min) 70  -MM  64  -REHANA (r) SM (t) REHANA (c)      Pretreatment Resp Rate (breaths/min) 21  -MM        Pre SpO2 (%) 92  -MM  93  -REHANA (r) SM (t) REHANA (c)      O2 Delivery Pre Treatment room air  -MM  room air  -REHANA (r) SM (t) REHANA (c)      Intra SpO2 (%) 88   Lowest O2, increased with cuing for adaptive breathing  -MM  97   Sitting EOB first time  -REHANA (r) SM (t) REHANA (c)      O2 Delivery Intra Treatment room air  -MM  room air  -REHANA (r) SM (t) REHANA (c)      Post SpO2 (%) 91  -MM  91  -REHANA (r) SM (t) REHANA (c)      O2 Delivery Post Treatment room air  -MM  room air  -REHANA (r) SM (t) REHANA (c)      Pre Patient Position Supine  -MM  Supine  -REHANA (r) SM (t) REHANA (c)      Intra Patient Position Sitting  -MM  Sitting  -REHANA (r) SM (t) REHANA (c)      Post Patient Position Supine  -MM  Supine  -REHANA (r) SM (t) REHANA (c)      Pain Assessment    Pain Assessment No/denies pain  -MM No/denies pain  -KW (r) LH (t) KW (c) No/denies pain  -REHANA (r) SM (t) REHANA (c)      Vision Assessment/Intervention    Visual Impairment WFL with corrective lenses  -MM  WFL with corrective lenses;blurred;diplopia   Per pt. report. Sometimes has blurred vision & diplopia.  -REHANA (r) SM (t) REHANA (c)      Visual Field   right visual field impairment   Peripheral visual assessment impaired on R  -REHANA (r) SM (t) REHANA (c)      Impact of Vision Impairment on Function   Impaired peripheral vision, blurred vision, and diplopia may affect balance and safety during activity.  -REHANA (r) SM (t) REHANA (c)      Cognitive Assessment/Intervention    Current Cognitive/Communication Assessment functional  -MM  functional  -REHANA (r) SM (t) REHANA (c)      Orientation Status oriented x 4  -MM  oriented x 4  -REHANA (r) SM (t) REHANA (c)      Follows Commands/Answers Questions 100% of the time;able to follow multi-step instructions  -MM  able to follow multi-step instructions;100% of the time;needs increased time  -REHANA (r) JUSTIN (t) REHANA  (c)      Personal Safety WNL/WFL  -MM  good awareness, safety precautions  -REHANA (r) SM (t) REHANA (c)      Personal Safety Interventions fall prevention program maintained;gait belt;muscle strengthening facilitated;nonskid shoes/slippers when out of bed;supervised activity  -MM  fall prevention program maintained;gait belt;muscle strengthening facilitated;nonskid shoes/slippers when out of bed  -REHANA (r) SM (t) REHANA (c)      ROM (Range of Motion)    General ROM Detail BUE WFL, see PT note for BLE  -MM  B LE AROM WFL. See OT IE for UE assessment.  -REHANA (r) SM (t) REHANA (c)      MMT (Manual Muscle Testing)    General MMT Assessment Detail BUE 4+/5, see PT note for BLE  -MM  R hip flexion 3+/5, L hip flexion 4-/5, B knee extension & flexion 5/5  -REHANA (r) SM (t) REHANA (c)      Muscle Tone Assessment    Muscle Tone Assessment     --  -KS    Bed Mobility, Assessment/Treatment    Bed Mobility, Assistive Device   bed rails;head of bed elevated;draw sheet  -REHANA (r) SM (t) REHANA (c)      Bed Mobility, Scoot/Bridge, [place] minimum assist (75% patient effort);2 person assist required  -MM  minimum assist (75% patient effort);2 person assist required  -REHANA (r) SM (t) REHANA (c)      Bed Mob, Supine to Sit, [place] contact guard assist;verbal cues required  -MM  contact guard assist;verbal cues required  -REHANA (r) SM (t) REHANA (c)      Bed Mob, Sit to Supine, [place] minimum assist (75% patient effort);contact guard assist;verbal cues required  -MM  contact guard assist;verbal cues required  -REHANA (r) SM (t) REHANA (c)      Bed Mobility, Safety Issues   decreased use of arms for pushing/pulling;decreased use of legs for bridging/pushing;impaired trunk control for bed mobility  -REHANA (r) SM (t) REHANA (c)      Bed Mobility, Impairments impaired balance  -MM  strength decreased  -REHANA (r) SM (t) REHANA (c)      Bed Mobility, Comment upon returning to bed, pt became nauseous/vomitting  -MM        Transfer Assessment/Treatment    Transfers, Sit-Stand [place]  contact guard assist;2 person assist required  -MM  minimum assist (75% patient effort);2 person assist required;upper extremity support;hand held assist;verbal cues required  -REHANA (r) SM (t) REHANA (c)      Transfers, Stand-Sit [place] contact guard assist;2 person assist required  -MM  minimum assist (75% patient effort);2 person assist required;verbal cues required;hand held assist;upper extremity support  -REHANA (r) SM (t) REHANA (c)      Transfers, Sit-Stand-Sit, Assist Device --   hand held assist  -MM        Toilet Transfer, [place]   minimum assist (75% patient effort);2 person assist required  -REHANA (r) SM (t) REHANA (c)      Toilet Transfer, Assistive Device   --   BSC  -REHANA (r) SM (t) REHANA (c)      Transfer, Safety Issues step length decreased;balance decreased during turns  -MM  balance decreased during turns;step length decreased  -REHANA (r) SM (t) REHANA (c)      Transfer, Impairments impaired balance  -MM  strength decreased;impaired balance;coordination impaired  -REHANA (r) SM (t) REHANA (c)      Functional Mobility    Functional Mobility- Ind. Level contact guard assist;2 person assist required  -MM        Functional Mobility- Device --   hand held assist  -MM        Functional Mobility- Comment Pt ambulated from bed to BS and back to bed  -MM        Upper Body Dressing Assessment/Training    UB Dressing Assess/Train, Clothing Type doffing:;donning:;hospital gown  -MM        UB Dressing Assess/Train, Position supine  -MM        UB Dressing Assess/Train, [place] minimum assist (75% patient effort);moderate assist (50% patient effort)  -MM        UB Dressing Assess/Train, Impairments impaired balance   nausea  -MM        Lower Body Dressing Assessment/Training    LB Dressing Assess/Train, Clothing Type doffing:;donning:;slipper socks  -MM        LB Dressing Assess/Train, Position edge of bed;sitting  -MM        LB Dressing Assess/Train, [place] moderate assist (50% patient effort)   Pt able to complete left foot,  difficulty with R foot  -MM        LB Dressing Assess/Train, Impairments impaired balance  -MM        Toileting Assessment/Training    Toileting Assess/Train, Comment Pt unable to void at [facility]  -MM        Motor Skills/Interventions    Additional Documentation Fine Motor Coordination Training (Group);Gross Motor Coordination Training (Group)  -MM  Balance Skills Training (Group);Gross Motor Coordination Training (Group)  -REHANA (r) SM (t) REHANA (c)      Balance Skills Training    Sitting-Level of Assistance   Close supervision;Contact guard  -REHANA (r) SM (t) REHANA (c)      Sitting-Balance Support   Right upper extremity supported;Left upper extremity supported;Feet supported  -REHANA (r) SM (t) REHANA (c)      Sitting # of Minutes   30  -REHANA (r) SM (t) REHANA (c)      Standing-Level of Assistance   Minimum assistance;x2  -REHANA (r) SM (t) REHANA (c)      Static Standing Balance Support   Right upper extremity supported;Left upper extremity supported  -REHANA (r) SM (t) REHANA (c)      Gait Balance-Level of Assistance   Minimum assistance;x2  -REHANA (r) SM (t) REHANA (c)      Gait Balance Support   Right upper extremity supported;Left upper extremity supported  -REHANA (r) SM (t) REHANA (c)      Gross Motor Coordination Training    Gross Motor Skill, Impairments Detail R hand dominant. Finger to nose impaired BUE. Incosistent impairment between testing: first trial R>L impairment, second trial L>R impairment  -MM  Inconsistent impairment between L & R UE. Required additional time and verbal cues for thumb to finger activity (R>L), finger to nose activity (R>L), and rapid alternating hand movements (L>R). Pt. is R hand dominant.  -REHANA (r) SM (t) REHANA (c)      Fine Motor Coordination Training    Opposition Right:;Left:;intact  -MM        Sensory Assessment/Intervention    Sensory Impairment   --   Gross LE light touch sensation WFL per pt. report  -REHANA (r) SM (t) REHANA (c)      Light Touch LUE;RUE  -MM        LUE Light Touch WNL  -MM        RUE Light Touch WNL  -MM        General  Therapy Interventions    Planned Therapy Interventions ADL retraining;adaptive equipment training;activity intolerance;balance training;bed mobility training;energy conservation;fine motor coordination training;home exercise program;strengthening;transfer training  -MM        Positioning and Restraints    Pre-Treatment Position in bed  -MM  in bed  -REHANA (r) SM (t) REHANA (c)      Post Treatment Position bed  -MM  bed  -REHANA (r) SM (t) REHANA (c)      In Bed notified nsg;supine;call light within reach;encouraged to call for assist;patient within staff view;with family/caregiver;with nsg;side rails up x2   emesis bag in lap  -MM  fowlers;notified nsg;with family/caregiver;call light within reach;encouraged to call for assist  -REHANA (r) SM (t) REHANA (c)        01/19/17 0148 01/19/17 0145 01/18/17 2200          Rehab Evaluation    Document Type   --  -KS      General Information    Equipment Currently Used at Home  none  -KS       Living Environment    Lives With spouse  -KS        Living Arrangements house  -KS        Home Accessibility no concerns  -KS        Stair Railings at Home none  -KS        Type of Financial/Environmental Concern none  -KS        Transportation Available car  -KS        Functional Level Prior    Ambulation  0-->independent  -KS       Transferring  0-->independent  -KS       Toileting  0-->independent  -KS       Bathing  0-->independent  -KS       Dressing  0-->independent  -KS       Eating  0-->independent  -KS       Communication  0-->understands/communicates without difficulty  -KS       Swallowing  0-->swallows foods/liquids without difficulty  -KS         User Key  (r) = Recorded By, (t) = Taken By, (c) = Cosigned By    Initials Name Effective Dates    REHANA Morrissey, PT DPT 08/02/16 -     KW Michelle Grace, MS CCC-SLP 08/02/16 -     CI Violet Mccloud RN 08/02/16 -     KS Susan Etienne, RN 08/02/16 -     MM Gregory Rodney, OTR/L 10/21/16 -     SM Shawna Mulvihill, PT Student 10/21/16 -     LH  Tess Mckinley, Speech Therapy Student 12/19/16 -            Occupational Therapy Education     Title: PT OT SLP Therapies (Active)     Topic: Occupational Therapy (Active)     Point: ADL training (Done)    Description: Instruct learner(s) on proper safety adaptation and remediation techniques during self care or transfers.   Instruct in proper use of assistive devices.    Learning Progress Summary    Learner Readiness Method Response Comment Documented by Status   Patient Acceptance E VU Pt and spouse educated on OT role, benefits and POC MM 01/19/17 1430 Done   Family Acceptance E VU Pt and spouse educated on OT role, benefits and POC MM 01/19/17 1430 Done                      User Key     Initials Effective Dates Name Provider Type Discipline     10/21/16 -  Gregory Rodney, OTR/L Occupational Therapist OT                  OT Recommendation and Plan  Anticipated Discharge Disposition: home with assist, home with home health  Planned Therapy Interventions: ADL retraining, adaptive equipment training, activity intolerance, balance training, bed mobility training, energy conservation, fine motor coordination training, home exercise program, strengthening, transfer training  Therapy Frequency: 3-5 times/wk  Plan of Care Review  Plan Of Care Reviewed With: patient, spouse  Progress: progress toward functional goals as expected  Outcome Summary/Follow up Plan: OT initial eval completed. Pt limited by nausea. Pt required CGA/min A for all mobility tasks. Pt required min A for UB dressing and MOD a to chuyita/doff socks. Pt gross motor coordination was mildly impaired. Skilled OT is recommended to address t/f, bed mobility, adls, activity tolerance, and gross motor coordination.Recommended d/c emma with assist .          OT Goals       01/19/17 1437 01/19/17 1431       Transfer Training OT LTG    Transfer Training OT LTG, Date Established  01/19/17  -MM     Transfer Training OT LTG, Time to Achieve  by discharge  -MM      Transfer Training OT LTG, Activity Type  all transfers  -MM     Transfer Training OT LTG, [place] Level  conditional independence  -MM     Transfer Training OT LTG, Assist Device  walker, rolling  -MM     Grooming OT LTG    Grooming Goal OT LTG, Date Established  01/19/17  -MM     Grooming Goal OT LTG, Time to Achieve  by discharge  -MM     Grooming Goal OT LTG, [place] Level  independent  -MM     Grooming Goal OT LTG, Position  standing  -MM     Toileting OT LTG    Toileting Goal OT LTG, Date Established  01/19/17  -MM     Toileting Goal OT LTG, Time to Achieve  by discharge  -MM     Toileting Goal OT LTG, [place] Level  independent  -MM     LB Dressing OT LTG    LB Dressing Goal OT LTG, Date Established  01/19/17  -MM     LB Dressing Goal OT LTG, Time to Achieve  by discharge  -MM     LB Dressing Goal OT LTG, [place] Level  conditional independence  -MM     LB Dressing Goal OT LTG, Adaptive Equipment  reacher;shoe horn, long handled;sock-aid   AE PRN  -MM     Coordination OT LTG    Coordination OT LTG, Date Established 01/19/17  -MM      Coordination OT LTG, Time to Achieve by discharge  -MM      Coordination OT LTG, Activity Type GM task  -MM      Coordination OT LTG, [place] Level independent  -MM        User Key  (r) = Recorded By, (t) = Taken By, (c) = Cosigned By    Initials Name Provider Type    DIEGO Rodney, OTR/L Occupational Therapist                Outcome Measures       01/19/17 1100 01/19/17 0821       How much help from another person do you currently need...    Turning from your back to your side while in flat bed without using bedrails?  3  -REHANA (r) SM (t) REHANA (c)     Moving from lying on back to sitting on the side of a flat bed without bedrails?  3  -REHANA (r) SM (t) REHANA (c)     Moving to and from a bed to a chair (including a wheelchair)?  2  -REHANA (r) SM (t) REHANA (c)     Standing up from a chair using your arms (e.g., wheelchair, bedside chair)?  3  -REHANA (r) SM (t) REHANA (c)      Climbing 3-5 steps with a railing?  1  -REHANA (r) SM (t) REHANA (c)     To walk in hospital room?  2  -REHANA (r) SM (t) REHANA (c)     AM-PAC 6 Clicks Score  14  -REHANA (r) SM (t)     How much help from another is currently needed...    Putting on and taking off regular lower body clothing? 2  -MM      Bathing (including washing, rinsing, and drying) 2  -MM      Toileting (which includes using toilet bed pan or urinal) 2  -MM      Putting on and taking off regular upper body clothing 2  -MM      Taking care of personal grooming (such as brushing teeth) 3  -MM      Eating meals 4  -MM      Score 15  -MM      Functional Assessment    Outcome Measure Options AM-PAC 6 Clicks Daily Activity (OT)  -MM AM-PAC 6 Clicks Basic Mobility (PT)  -REHANA (r) SM (t) REHANA (c)       User Key  (r) = Recorded By, (t) = Taken By, (c) = Cosigned By    Initials Name Provider Type    REHANA Morrissey, PT DPT Physical Therapist    MM Gregory Rodney OTR/L Occupational Therapist     Shawna Mulvihill, PT Student PT Student          Time Calculation:   OT Start Time: 0830  OT Stop Time: 1010  OT Time Calculation (min): 100 min  OT Non-Billable Time (min): 15 min    Therapy Charges for Today     Code Description Service Date Service Provider Modifiers Qty    96664547633  OT SELFCARE CURRENT 1/19/2017 Gregory Rodney OTR/L GO, CK 1    94862850854 HC OT SELFCARE PROJECTED 1/19/2017 Gregory Rodney OTR/L GO, CI 1    [id number]  OT EVAL MOD COMPLEXITY 4 1/19/2017 Gregory Rodney OTR/L GO 1    63835309656 HC OT SELF CARE/MGMT/TRAIN EA 15 MIN 1/19/2017 Gregory Rodney OTR/L GO 1    [id number]  OT THER SUPP EA 15 MIN 1/19/2017 Gregory Rodney OTR/L GO 1          OT G-codes  OT Professional Judgement Used?: Yes  OT Functional Scales Options: AM-PAC 6 Clicks Daily Activity (OT)  Score: 15  Functional Limitation: Self care  Self Care Current Status (): At least 40 percent but less than 60 percent impaired, limited or restricted  Self Care Goal  Status (): At least 1 percent but less than 20 percent impaired, limited or restricted    Gregory Rodney, OTR/L  1/19/2017

## 2017-01-19 NOTE — PLAN OF CARE
Problem: Patient Care Overview (Adult)  Goal: Plan of Care Review  Outcome: Ongoing (interventions implemented as appropriate)    01/19/17 1431   Coping/Psychosocial Response Interventions   Plan Of Care Reviewed With patient;spouse   Patient Care Overview   Progress progress toward functional goals as expected   Outcome Evaluation   Outcome Summary/Follow up Plan OT initial eval completed. Pt limited by nausea. Pt required CGA/min A for all mobility tasks. Pt required min A for UB dressing and MOD a to chuyita/doff socks. Pt gross motor coordination was mildly impaired. Skilled OT is recommended to address t/f, bed mobility, adls, activity tolerance, and gross motor coordination.Recommended d/c emma with assist .         Problem: Inpatient Occupational Therapy  Goal: Transfer Training Goal 1 LTG- OT  Outcome: Ongoing (interventions implemented as appropriate)    01/19/17 1431   Transfer Training OT LTG   Transfer Training OT LTG, Date Established 01/19/17   Transfer Training OT LTG, Time to Achieve by discharge   Transfer Training OT LTG, Activity Type all transfers   Transfer Training OT LTG, [place] Level conditional independence   Transfer Training OT LTG, Assist Device walker, rolling       Goal: Grooming Goal LTG- OT  Outcome: Ongoing (interventions implemented as appropriate)    01/19/17 1431   Grooming OT LTG   Grooming Goal OT LTG, Date Established 01/19/17   Grooming Goal OT LTG, Time to Achieve by discharge   Grooming Goal OT LTG, [place] Level independent   Grooming Goal OT LTG, Position standing       Goal: Toileting Goal LTG- OT  Outcome: Ongoing (interventions implemented as appropriate)    01/19/17 1431   Toileting OT LTG   Toileting Goal OT LTG, Date Established 01/19/17   Toileting Goal OT LTG, Time to Achieve by discharge   Toileting Goal OT LTG, [place] Level independent       Goal: LB Dressing Goal LTG- OT  Outcome: Ongoing (interventions implemented as appropriate)    01/19/17 1431    LB Dressing OT LTG   LB Dressing Goal OT LTG, Date Established 01/19/17   LB Dressing Goal OT LTG, Time to Achieve by discharge   LB Dressing Goal OT LTG, [place] Level conditional independence   LB Dressing Goal OT LTG, Adaptive Equipment reacher;shoe horn, long handled;sock-aid  (AE PRN)

## 2017-01-19 NOTE — PROGRESS NOTES
Acute Care - Physical Therapy Initial Evaluation  [facility]     Patient Name: Ranjeet Bajwa  : 1945  MRN: [id number]  Today's Date: 2017   Onset of Illness/Injury or Date of Surgery Date: 17  Date of Referral to PT: 17  Referring Physician: Dr. Tellez      Admit Date: 2017     Visit Dx:    ICD-10-CM ICD-9-CM   1. NSTEMI (non-ST elevated myocardial infarction) I21.4 410.70   2. Cerebrovascular accident (CVA), unspecified mechanism I63.9 434.91   3. Oropharyngeal dysphagia R13.12 787.22   4. 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   • NSTEMI (non-ST elevated myocardial infarction)     Past Medical History   Diagnosis Date   • Coronary artery disease    • Diabetes mellitus    • Hypertension      Past Surgical History   Procedure Laterality Date   • Cardiac catheterization            PT ASSESSMENT (last 72 hours)      PT Evaluation       17 0835 17 0830    Rehab Evaluation    Document Type evaluation  -MM (P)  evaluation  -    Subjective Information agree to therapy;complains of;fatigue;nausea/vomiting;dizziness   no nausea/dizziness intiailly, only with movement  -MM (P)  complains of;nausea/vomiting  -    Symptoms Noted During/After Treatment  (P)  none  -    Symptoms Noted Comment  (P)  Pt was nauseated when head was first elevated.  -    General Information    Patient Profile Review yes  -MM     Onset of Illness/Injury or Date of Surgery Date 17  -MM     Referring Physician Dr. Tellez  -MM     General Observations pt in fowlers, spouse present, blood pressure cuff, SpO2 monitor, telemetry, IV, hollins  -MM     Precautions/Limitations fall precautions  -MM     Prior Level of Function independent:;all household mobility;community mobility;feeding;grooming;dressing;bathing;cooking;cleaning;driving  -MM     Equipment Currently Used at Home oxygen;crutches,  axillary;cane, straight;walker, standard   CPAP at night  -MM     Plans/Goals Discussed With patient and family;agreed upon  -MM     Risks Reviewed patient and family:;LOB;nausea/vomiting;dizziness;increased discomfort;change in vital signs;increased drainage;lines disloged  -MM     Benefits Reviewed patient and family:;improve function;increase independence;increase strength;increase balance;decrease pain;decrease risk of DVT;improve skin integrity;increase knowledge  -MM     Living Environment    Lives With spouse  -MM     Living Arrangements house  -MM     Home Accessibility grab bars present (bathtub);grab bars present (toilet);stairs to enter home;bed and bath on same level   walk in shower  -MM     Number of Stairs to Enter Home 2  -MM     Stair Railings at Home none  -MM     Transportation Available car;family or friend will provide  -MM     Vital Signs    Pre Systolic BP Rehab 177  -MM     Pre Treatment Diastolic BP 71  -MM     Post Systolic BP Rehab 195  -MM     Post Treatment Diastolic BP 75  -MM     Pretreatment Heart Rate (beats/min) 70  -MM     Pretreatment Resp Rate (breaths/min) 21  -MM     Pre SpO2 (%) 92  -MM     O2 Delivery Pre Treatment room air  -MM     Intra SpO2 (%) 88   Lowest O2, increased with cuing for adaptive breathing  -MM     O2 Delivery Intra Treatment room air  -MM     Post SpO2 (%) 91  -MM     O2 Delivery Post Treatment room air  -MM     Pre Patient Position Supine  -MM     Intra Patient Position Sitting  -MM     Post Patient Position Supine  -MM     Pain Assessment    Pain Assessment No/denies pain  -MM (P)  No/denies pain  -LH    Vision Assessment/Intervention    Visual Impairment WFL with corrective lenses  -MM     Cognitive Assessment/Intervention    Current Cognitive/Communication Assessment functional  -MM     Orientation Status oriented x 4  -MM     Follows Commands/Answers Questions 100% of the time;able to follow multi-step instructions  -MM     Personal Safety WNL/WFL  -MM      Personal Safety Interventions fall prevention program maintained;gait belt;muscle strengthening facilitated;nonskid shoes/slippers when out of bed;supervised activity  -MM     ROM (Range of Motion)    General ROM Detail BUE WFL, see PT note for BLE  -MM     MMT (Manual Muscle Testing)    General MMT Assessment Detail BUE 4+/5, see PT note for BLE  -MM     Bed Mobility, Assessment/Treatment    Bed Mobility, Scoot/Bridge, [place] minimum assist (75% patient effort);2 person assist required  -MM     Bed Mob, Supine to Sit, [place] contact guard assist;verbal cues required  -MM     Bed Mob, Sit to Supine, [place] minimum assist (75% patient effort);contact guard assist;verbal cues required  -MM     Bed Mobility, Impairments impaired balance  -MM     Bed Mobility, Comment upon returning to bed, pt became nauseous/vomitting  -MM     Transfer Assessment/Treatment    Transfers, Sit-Stand [place] contact guard assist;2 person assist required  -MM     Transfers, Stand-Sit [place] contact guard assist;2 person assist required  -MM     Transfers, Sit-Stand-Sit, Assist Device --   hand held assist  -MM     Transfer, Safety Issues step length decreased;balance decreased during turns  -MM     Transfer, Impairments impaired balance  -MM     Motor Skills/Interventions    Additional Documentation Fine Motor Coordination Training (Group);Gross Motor Coordination Training (Group)  -MM     Fine Motor Coordination Training    Opposition Right:;Left:;intact  -MM     Gross Motor Coordination Training    Gross Motor Skill, Impairments Detail R hand dominant. Finger to nose impaired BUE. Incosistent impairment between testing: first trial R>L impairment, second trial L>R impairment  -MM     Sensory Assessment/Intervention    Light Touch LUE;RUE  -MM     LUE Light Touch WNL  -MM     RUE Light Touch WNL  -MM     Positioning and Restraints    Pre-Treatment Position in bed  -MM     Post Treatment Position bed  -MM      In Bed notified nsg;supine;call light within reach;encouraged to call for assist;patient within staff view;with family/caregiver;with nsg;side rails up x2   emesis bag in lap  -MM       01/19/17 0821 01/19/17 0638    Rehab Evaluation    Document Type evaluation   See MAR  -REHANA (r) SM (t) REHANA (c)     Subjective Information agree to therapy;complains of;fatigue  -REHANA (r) SM (t) REHANA (c)     Symptoms Noted During/After Treatment shortness of breath   Pt. reported SOB when first sitting EOB  -REHANA (r) SM (t) REHANA (c)     Symptoms Noted Comment Pt. vomited after returning to bed  -REHANA (r) SM (t) REHANA (c)     General Information    Patient Profile Review yes  -REHANA (r) SM (t) REHANA (c)     Onset of Illness/Injury or Date of Surgery Date 01/18/17  -REHANA (r) SM (t) REHANA (c)     Referring Physician Dr. Tellez   stroke patient  -REHANA (r) SM (t) REHANA (c)     General Observations Fowlers in bed, alert, wife present  -REHANA (r) SM (t) REHANA (c)     Pertinent History Of Current Problem CC: nausea/vomiting, abnormal speech, weakness. Dx: Possible TIA vs. CVA, elevated troponin & possible NSTEMI, DM 2. 1/18 MRI brain: Acute bilateral cerebellar infarcts that are fairly large in size. Chronic ischemic white matter disease involving cerebral hemispheres. Mild atrophy. Mucosal inflammatory changes involving paranasal sinuses. 1/18 CT angiogram chest: Coronary artery calcifications are noted. 1/18 CT abdomen/pelvis: smal area of splenic infarction (likely acute). 1/18 CXR. 1/18 CT head.  -REHANA (r) SM (t) REHANA (c)     Precautions/Limitations fall precautions  -REHANA (r) SM (t) REHANA (c)     Prior Level of Function independent:;all household mobility;community mobility;gait;transfer;ADL's;feeding;bathing;dressing;cleaning;driving  -REHANA (r) SM (t) REHANA (c)     Equipment Currently Used at Home cane, straight;crutches;walker, standard;oxygen   CPAP at night  -REHANA (r) SM (t) REHANA (c) none  -CI    Plans/Goals Discussed With patient;spouse/S.O.;agreed upon  -REHANA (r) SM (t) REHANA arguelles)      Risks Reviewed patient and family:;LOB;nausea/vomiting;dizziness;increased discomfort;change in vital signs  -REHANA (r) SM (t) REHANA (c)     Benefits Reviewed patient and family:;improve function;increase independence;increase strength;increase balance;increase knowledge;improve skin integrity;decrease pain  -REHANA     Barriers to Rehab medically complex  -REHANA     Living Environment    Lives With spouse  -REHANA (r) JUSTIN (t) REHANA (c)     Living Arrangements house  -REHANA (r) SM (t) REHANA (c)     Home Accessibility bed and bath on same level;stairs to enter home;grab bars present (bathtub);grab bars present (toilet)  -REHANA (r) SM (t) REHANA (c)     Number of Stairs to Enter Home 2  -REHANA (r) SM (t) REHANA (c)     Stair Railings at Home none  -REHANA (r) SM (t) REHANA (c)     Transportation Available  family or friend will provide  -CI    Clinical Impression    Date of Referral to PT 01/18/17  -REHANA (r) JUSTIN (t) REHANA (c)     PT Diagnosis impaired balance & mobility, weakness  -REHANA (r) SM (t) REHANA (c)     Functional Level At Time Of Evaluation SBA-CGA for bed mobility, Min A x2 for sit/stand and stand/sit, Min A x2 for gait ~3 ft x2 to [facility] and back to bed  -REHANA (r) SM (t) REHANA (c)     Patient/Family Goals Statement Go home  -REHANA (r) SM (t) REHANA (c)     Criteria for Skilled Therapeutic Interventions Met yes;treatment indicated  -REHANA (r) JUSTIN (t) REHANA (c)     Impairments Found (describe specific impairments) gait, locomotion, and balance  -REHANA (r) SM (t) REHANA (c)     Rehab Potential good, to achieve stated therapy goals  -REHANA (r) SM (t) REHANA (c)     Predicted Duration of Therapy Intervention (days/wks) until d/c  -REHANA (r) SM (t) REHANA (c)     Vital Signs    Pre Systolic BP Rehab 177  -REHANA (r) SM (t) REHANA (c)     Pre Treatment Diastolic BP 71  -REHANA (r) SM (t) REHANA (c)     Post Systolic BP Rehab 195  -REHANA (r) SM (t) REHANA (c)     Post Treatment Diastolic BP 75  -REHANA (r) SM (t) REHANA (c)     Pretreatment Heart Rate (beats/min) 64  -REHANA (r) SM (t) REHANA (c)     Pre SpO2 (%) 93  -REHANA (r) SM (t) REHANA (c)     O2 Delivery  Pre Treatment room air  -REHANA (r) SM (t) REHANA (c)     Intra SpO2 (%) 97   Sitting EOB first time  -REHANA (r) SM (t) REHANA (c)     O2 Delivery Intra Treatment room air  -REHANA (r) SM (t) REHANA (c)     Post SpO2 (%) 91  -REHANA (r) SM (t) REHANA (c)     O2 Delivery Post Treatment room air  -REHANA (r) SM (t) REHANA (c)     Pre Patient Position Supine  -REHANA (r) SM (t) REHANA (c)     Intra Patient Position Sitting  -REHANA (r) SM (t) REHANA (c)     Post Patient Position Supine  -REHANA (r) SM (t) REHANA (c)     Pain Assessment    Pain Assessment No/denies pain  -REHANA (r) SM (t) REHANA (c)     Vision Assessment/Intervention    Visual Impairment WFL with corrective lenses;blurred;diplopia   Per pt. report. Sometimes has blurred vision & diplopia.  -REHANA (r) SM (t) REHANA (c)     Visual Field right visual field impairment   Peripheral visual assessment impaired on R  -REHANA (r) SM (t) REHANA (c)     Impact of Vision Impairment on Function Impaired peripheral vision, blurred vision, and diplopia may affect balance and safety during activity.  -REHANA (r) SM (t) REHANA (c)     Cognitive Assessment/Intervention    Current Cognitive/Communication Assessment functional  -REHANA (r) SM (t) REHANA (c)     Orientation Status oriented x 4  -REHANA (r) SM (t) REHANA (c)     Follows Commands/Answers Questions able to follow multi-step instructions;100% of the time;needs increased time  -REHANA (r) SM (t) REHANA (c)     Personal Safety good awareness, safety precautions  -REHANA (r) SM (t) REHANA (c)     Personal Safety Interventions fall prevention program maintained;gait belt;muscle strengthening facilitated;nonskid shoes/slippers when out of bed  -REHANA (r) SM (t) REHANA (c)     ROM (Range of Motion)    General ROM Detail B LE AROM WFL. See OT IE for UE assessment.  -REHANA (r) SM (t) REHANA (c)     MMT (Manual Muscle Testing)    General MMT Assessment Detail R hip flexion 3+/5, L hip flexion 4-/5, B knee extension & flexion 5/5  -REHANA (r) SM (t) REHANA (c)     Bed Mobility, Assessment/Treatment    Bed Mobility, Assistive Device bed rails;head of bed  elevated;draw sheet  -REHANA (r) SM (t) REHANA (c)     Bed Mobility, Scoot/Bridge, [place] minimum assist (75% patient effort);2 person assist required  -REHANA (r) SM (t) REHANA (c)     Bed Mob, Supine to Sit, [place] contact guard assist;verbal cues required  -REHANA (r) SM (t) REHANA (c)     Bed Mob, Sit to Supine, [place] contact guard assist;verbal cues required  -REHANA (r) SM (t) REHANA (c)     Bed Mobility, Safety Issues decreased use of arms for pushing/pulling;decreased use of legs for bridging/pushing;impaired trunk control for bed mobility  -REHANA (r) SM (t) REHANA (c)     Bed Mobility, Impairments strength decreased  -REHANA (r) SM (t) REHANA (c)     Transfer Assessment/Treatment    Transfers, Sit-Stand [place] minimum assist (75% patient effort);2 person assist required;upper extremity support;hand held assist;verbal cues required  -REHANA (r) SM (t) REHANA (c)     Transfers, Stand-Sit [place] minimum assist (75% patient effort);2 person assist required;verbal cues required;hand held assist;upper extremity support  -REHANA (r) SM (t) REHANA (c)     Toilet Transfer, [place] minimum assist (75% patient effort);2 person assist required  -REHANA (r) SM (t) REHANA (c)     Toilet Transfer, Assistive Device --   BSC  -REHANA (r) SM (t) REHANA (c)     Transfer, Safety Issues balance decreased during turns;step length decreased  -REHANA (r) SM (t) REHANA (c)     Transfer, Impairments strength decreased;impaired balance;coordination impaired  -REHANA (r) SM (t) REHANA (c)     Gait Assessment/Treatment    Gait, [place] Level minimum assist (75% patient effort);2 person assist required;upper extremity support;hand held assist;verbal cues required  -REHANA (r) SM (t) REHANA (c)     Gait, Distance (Feet) 6   3 ft. x2 to use BSC  -REHANA (r) SM (t) REHANA (c)     Gait, Gait Pattern Analysis swing-through gait  -REHANA (r) SM (t) REHANA (c)     Gait, Gait Deviations lucita decreased;decreased heel strike;forward flexed posture;step length decreased;ataxia  -REHANA (r) SM (t) REHANA (c)     Gait, Safety  Issues balance decreased during turns;step length decreased  -REHANA (r) SM (t) REHANA (c)     Gait, Impairments strength decreased;impaired balance;coordination impaired;postural control impaired  -REHANA (r) SM (t) REHANA (c)     Gait, Comment Pt.'s wife reported that pt. had a habit of walking with a mild shuffling pattern prior to injury because he was in [facility]  -REHANA (r) SM (t) REHANA (c)     Motor Skills/Interventions    Additional Documentation Balance Skills Training (Group);Gross Motor Coordination Training (Group)  -REHANA (r) SM (t) REHANA (c)     Balance Skills Training    Sitting-Level of Assistance Close supervision;Contact guard  -REHANA (r) SM (t) REHANA (c)     Sitting-Balance Support Right upper extremity supported;Left upper extremity supported;Feet supported  -REHANA (r) SM (t) REHANA (c)     Sitting # of Minutes 30  -REHANA (r) SM (t) REHANA (c)     Standing-Level of Assistance Minimum assistance;x2  -REHANA (r) SM (t) REHANA (c)     Static Standing Balance Support Right upper extremity supported;Left upper extremity supported  -REHANA (r) SM (t) REHANA (c)     Gait Balance-Level of Assistance Minimum assistance;x2  -REHANA (r) SM (t) REHANA (c)     Gait Balance Support Right upper extremity supported;Left upper extremity supported  -REHANA (r) SM (t) REHANA (c)     Gross Motor Coordination Training    Gross Motor Skill, Impairments Detail Inconsistent impairment between L & R UE. Required additional time and verbal cues for thumb to finger activity (R>L), finger to nose activity (R>L), and rapid alternating hand movements (L>R). Pt. is R hand dominant.  -REHANA (r) SM (t) REHANA (c)     Sensory Assessment/Intervention    Sensory Impairment --   Gross LE light touch sensation WFL per pt. report  -REHANA (r) SM (t) REHANA (c)     Positioning and Restraints    Pre-Treatment Position in bed  -REHANA (r) SM (t) REHANA (c)     Post Treatment Position bed  -REHANA (r) SM (t) REHANA (c)     In Bed fowlers;notified nsg;with family/caregiver;call light within reach;encouraged to call for assist  -REHANA (r) SM (t) REHANA (c)        01/19/17 0200 01/19/17 0148    Living Environment    Lives With  spouse  -KS    Living Arrangements  house  -KS    Home Accessibility  no concerns  -KS    Stair Railings at Home  none  -KS    Type of Financial/Environmental Concern  none  -KS    Transportation Available  car  -KS    Muscle Tone Assessment    Muscle Tone Assessment --  -KS       01/19/17 0145 01/18/17 2200    Rehab Evaluation    Document Type  --  -KS    General Information    Equipment Currently Used at Home none  -KS       User Key  (r) = Recorded By, (t) = Taken By, (c) = Cosigned By    Initials Name Provider Type    REHANA Morrissey, PT DPT Physical Therapist    NICK Mccloud, RN Registered Nurse    MELISSA Etienne, RN Registered Nurse    MM Gregory Rodney, OTR/L Occupational Therapist     Shawna Mulvihill, PT Student PT Student    ANA MARIA Mckinley, Speech Therapy Student Speech Therapy Student          Physical Therapy Education     Title: PT OT SLP Therapies (Active)     Topic: Physical Therapy (Active)     Point: Mobility training (Done)    Learning Progress Summary    Learner Readiness Method Response Comment Documented by Status   Patient Acceptance E VU Pt. and wife were educated on progression of PT POC and benefits of mobility.  01/19/17 0821 Done   Significant Other Acceptance E VU Pt. and wife were educated on progression of PT POC and benefits of mobility.  01/19/17 0821 Done                      User Key     Initials Effective Dates Name Provider Type Discipline     10/21/16 -  Shawna Mulvihill, PT Student PT Student PT                PT Recommendation and Plan  Anticipated Discharge Disposition: home with assist, home with home health (pending pt. progress)  Planned Therapy Interventions: bed mobility training, transfer training, gait training, balance training, motor coordination training, home exercise program, patient/family education, postural re-education, strengthening, stair training  PT Frequency:  daily, 2 times/day            IP PT Goals       01/19/17 0821          Bed Mobility PT LTG    Bed Mobility PT LTG, Date Established 01/19/17  -REHANA (r) SM (t) REHANA (c)      Bed Mobility PT LTG, Time to Achieve by discharge  -REHANA (r) SM (t) REHANA (c)      Bed Mobility PT LTG, Activity Type all bed mobility  -REHANA (r) SM (t) REHANA (c)      Bed Mobility PT LTG, [place] Level independent  -REHANA (r) SM (t) REHANA (c)      Bed Mobility PT Goal  LTG, Assist Device bed rails  -REHANA (r) SM (t) REHANA (c)      Transfer Training PT LTG    Transfer Training PT LTG, Date Established 01/19/17  -REHANA (r) SM (t) REHANA (c)      Transfer Training PT LTG, Time to Achieve by discharge  -REHANA (r) SM (t) REHANA (c)      Transfer Training PT LTG, Activity Type bed to chair /chair to bed;sit to stand/stand to sit  -REHANA (r) SM (t) REHANA (c)      Transfer Training PT LTG, [place] Level conditional independence  -REHANA (r) SM (t) REHANA (c)      Transfer Training PT LTG, Assist Device walker, rolling  -REHANA (r) SM (t) REHANA (c)      Gait Training PT LTG    Gait Training Goal PT LTG, Date Established 01/19/17  -REHANA (r) SM (t) REHANA (c)      Gait Training Goal PT LTG, Time to Achieve by discharge  -REHANA (r) SM (t) REHANA (c)      Gait Training Goal PT LTG, [place] Level supervision required  -REHANA (r) SM (t) REHANA (c)      Gait Training Goal PT LTG, Assist Device walker, rolling  -REHANA (r) SM (t) REHANA (c)      Gait Training Goal PT LTG, Distance to Achieve 150  -REHANA (r) SM (t) REHANA (c)      Dynamic Standing Balance PT LTG    Dynamic Standing Balance PT LTG, Date Established 01/19/17  -REHANA (r) SM (t) REHANA (c)      Dynamic Standing Balance PT LTG, Time to Achieve by discharge  -REHANA (r) SM (t) REHANA (c)      Dynamic Standing Balance PT LTG, [place] Level supervision required;contact guard assist  -REHANA (r) SM (t) REHANA (c)      Dynamic Standing Balance PT LTG, Additional Goal Standing balance activities of head movements and LE pre-gait activities such as multi-directional weight shifts and marching x10 reps to  improve strength and independence  -REHANA (r) SM (t) REHANA (c)        User Key  (r) = Recorded By, (t) = Taken By, (c) = Cosigned By    Initials Name Provider Type    REHANA Morrissey, PT DPT Physical Therapist    SM Shawna Mulvihill, PT Student PT Student                Outcome Measures       01/19/17 1100 01/19/17 0821       How much help from another person do you currently need...    Turning from your back to your side while in flat bed without using bedrails?  3  -REHANA (r) SM (t) REHANA (c)     Moving from lying on back to sitting on the side of a flat bed without bedrails?  3  -REHANA (r) SM (t) REHANA (c)     Moving to and from a bed to a chair (including a wheelchair)?  2  -REHANA (r) SM (t) REHANA (c)     Standing up from a chair using your arms (e.g., wheelchair, bedside chair)?  3  -REHANA (r) SM (t) REHANA (c)     Climbing 3-5 steps with a railing?  1  -REHANA (r) SM (t) REHANA (c)     To walk in hospital room?  2  -REHANA (r) SM (t) REHANA (c)     AM-PAC 6 Clicks Score  14  -REHANA (r) SM (t)     How much help from another is currently needed...    Putting on and taking off regular lower body clothing? 2  -MM      Bathing (including washing, rinsing, and drying) 2  -MM      Toileting (which includes using toilet bed pan or urinal) 2  -MM      Putting on and taking off regular upper body clothing 2  -MM      Taking care of personal grooming (such as brushing teeth) 3  -MM      Eating meals 4  -MM      Score 15  -MM      Functional Assessment    Outcome Measure Options AM-PAC 6 Clicks Daily Activity (OT)  -MM AM-PAC 6 Clicks Basic Mobility (PT)  -REHANA (r) SM (t) REHANA (c)       User Key  (r) = Recorded By, (t) = Taken By, (c) = Cosigned By    Initials Name Provider Type    REHANA Morrissey, PT DPT Physical Therapist    DIEGO Rodney, OTR/L Occupational Therapist    SM Shawna Mulvihill, PT Student PT Student           Time Calculation:         PT Charges       01/19/17 0821          Time Calculation    Start Time 0821  -REHANA (r) JUSTIN (t) REHNAA (c)      Stop  Time 1009  -REHANA (r) SM (t) REHANA (c)      Time Calculation (min) 108 min  -REHANA (r) SM (t)      PT Non-Billable Time (min) 15 min   Waiting for Speech Therapy to finish with pt.  -REHANA (r) SM (t) REHANA (c)      PT Received On 01/19/17  -REHANA (r) SM (t) REHANA (c)      PT Goal Re-Cert Due Date 01/29/17  -REHANA (r) SM (t) REHANA (c)      Time Calculation- PT    Total Timed Code Minutes- PT 33 minute(s)  -REHANA (r) SM (t) REHANA (c)        User Key  (r) = Recorded By, (t) = Taken By, (c) = Cosigned By    Initials Name Provider Type    REHANA Morrissey, PT DPT Physical Therapist    SM Shawna Mulvihill, PT Student PT Student          Therapy Charges for Today     Code Description Service Date Service Provider Modifiers Qty    82685157549 HC PT EVAL MOD COMPLEXITY 4 1/19/2017 Aramis Morrissey, PT DPT GP 1    85281826210 HC PT THER SUPP EA 15 MIN 1/19/2017 Aramis Morrissey, PT DPT GP 2    58275193088 HC PT THERAPEUTIC ACT EA 15 MIN 1/19/2017 Aramis Morrissey, PT DPT GP 1    08292750680 HC PT MOBILITY CURRENT 1/19/2017 Aramis Morrissey, PT DPT GP, CK 1    52309979747 HC PT MOBILITY PROJECTED 1/19/2017 Aramis Morrissey, PT DPT GP, CI 1          PT G-Codes  Outcome Measure Options: AM-PAC 6 Clicks Daily Activity (OT)  Score: 14  Functional Limitation: Mobility: Walking and moving around  Mobility: Walking and Moving Around Current Status (): At least 40 percent but less than 60 percent impaired, limited or restricted  Mobility: Walking and Moving Around Goal Status (): At least 1 percent but less than 20 percent impaired, limited or restricted      Aramis Morrissey, PT DPT  1/19/2017

## 2017-01-19 NOTE — PROGRESS NOTES
Neurology Progress Note      Chief Complaint:  nausea    Subjective     Subjective:  Wife is at bedside.  Patient has been irritable and frustrated with Thomason catheter.  He has had no new symptoms.  He has not had any problems with emesis overnight but still has some nausea.    Medications: Reviewed  Current Facility-Administered Medications   Medication Dose Route Frequency Provider Last Rate Last Dose   • acetaminophen (TYLENOL) tablet 650 mg  650 mg Oral Q4H PRN Kody Tellez MD       • aspirin chewable tablet 81 mg  81 mg Oral Daily Kody Tellez MD        Or   • aspirin suppository 300 mg  300 mg Rectal Daily Kody Tellez MD       • atorvastatin (LIPITOR) tablet 80 mg  80 mg Oral Nightly Kody Tellez MD   80 mg at 01/19/17 0012   • brimonidine (ALPHAGAN) 0.2 % ophthalmic solution 1 drop  1 drop Both Eyes TID Kody Tellez MD   1 drop at 01/19/17 0009   • dextrose (D50W) solution 25 g  25 g Intravenous Q15 Min PRN Kody Tellez MD       • dextrose (GLUTOSE) oral gel 15 g  15 g Oral Q15 Min PRN Kody Tellez MD       • dorzolamide-timolol (COSOPT) ophthalmic solution 1 drop  1 drop Both Eyes BID Kody Tellez MD   1 drop at 01/19/17 0014   • enoxaparin (LOVENOX) syringe 40 mg  40 mg Subcutaneous Daily Kody Tellez MD       • glucagon (human recombinant) (GLUCAGEN DIAGNOSTIC) injection 1 mg  1 mg Subcutaneous Q15 Min PRN Kody Tellez MD       • insulin detemir (LEVEMIR) injection 25 Units  25 Units Subcutaneous Nightly Kody Tellez MD       • insulin lispro (humaLOG) injection 2-9 Units  2-9 Units Subcutaneous 4x Daily AC & at Bedtime Kody Tellez MD   4 Units at 01/19/17 0012   • labetalol (NORMODYNE,TRANDATE) 1 mg/mL in sodium chloride 0.9 % 200 mL infusion  0.5-2 mg/min Intravenous Titrated Kody Tellez MD   0.5 mg/min at 01/18/17 2329    And   • labetalol (NORMODYNE,TRANDATE) injection 10 mg  10 mg Intravenous  Q4H PRN Kody Tellez MD       • latanoprost (XALATAN) 0.005 % ophthalmic solution 1 drop  1 drop Both Eyes Daily Kody Tellez MD       • ondansetron (ZOFRAN) injection 4 mg  4 mg Intravenous Q6H PRN Kody Tellez MD       • sodium chloride 0.9 % flush 1-10 mL  1-10 mL Intravenous PRN Kody Tellez MD       • sodium chloride 0.9 % flush 1-10 mL  1-10 mL Intravenous PRN Kody Tellez MD       • sodium chloride 0.9 % flush 10 mL  10 mL Intravenous PRN Demi Daniel MD       • sodium chloride 0.9 % infusion  50 mL/hr Intravenous Continuous Kody Tellez MD 50 mL/hr at 01/19/17 0005 50 mL/hr at 01/19/17 0005   • tamsulosin (FLOMAX) 24 hr capsule 0.4 mg  0.4 mg Oral Daily Kody Tellez MD           Review of Systems:   -A 14 point review of systems is completed and is negative except for nausea and discomfort due to Thomason      Objective      Vital Signs  Temp:  [97.2 °F (36.2 °C)-97.5 °F (36.4 °C)] 97.4 °F (36.3 °C)  Heart Rate:  [] 89  Resp:  [15-22] 18  BP: (114-195)/(51-98) 165/67    Physical Exam:    HEENT:  Neck supple  CV:  Regular rate and rhythm.  No murmurs  Lungs:  Clear to auscultation  Abdomen:  Non-tender, Non-distended  Extremities:  No signs of peripheral edema    Neurologic Exam:  Alert oriented and fluent  Extraocular movements are full and without nystagmus  Face is symmetric  No dysarthria  Power 5/5 in all extremities  Mild dysdiadochokinesis right lower extremity  Gait not assessed    -Results Review:    BRAIN MRI (images reviewed independently)  1. Acute bilateral cerebellar infarcts that are fairly large in size.  2. Chronic ischemic white matter disease involving the cerebral  hemispheres.  3. Mild atrophy.  4. Mucosal inflammatory changes involving the paranasal sinuses.        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    NSTEMI (non-ST elevated myocardial  infarction)    Impression:  Bilateral right > left cerebellar acute stroke, involving medial cerebellar regions only, not large cerebellar hemispheric involvement  Likely dominant right PICA   Exam remains very benign, will likely have gait difficulty    At some risk for development of hydrocephalus  Blood pressure range reasonable in setting of acute stroke    Recommendations  Continue close neuro checks  CT angiogram head/neck  Routine echocardiogram  Check lipids  Increase aspirin 325 mg for now, further recommendations pending CTA and echocardiogram results   [place] management of blood pressure, treat systolic blood pressure >180   PT, OT, speech therapy  Acute rehabilitation evaluation depending on PT/OT findings     Discussed my findings and recommendations with patient's wife and nursing.    Yadira Goodman MD  01/19/17  8:53 AM

## 2017-01-19 NOTE — PLAN OF CARE
Problem: Patient Care Overview (Adult)  Goal: Plan of Care Review  Outcome: Ongoing (interventions implemented as appropriate)    01/19/17 0916   Coping/Psychosocial Response Interventions   Plan Of Care Reviewed With patient;spouse   Patient Care Overview   Progress improving   Outcome Evaluation   Outcome Summary/Follow up Plan Cough with initial trial of thin liquids. No cough on multiple trials of thin liquids following. Will start a full liquid diet due to nausea and vomiting. Ok to upgrade diet to regular per MD.

## 2017-01-19 NOTE — NURSING NOTE
Notified Dr. Goodman of pt. MRI results. Bilateral Cerebella infarcts fairly large in size, chronic ischemic white matter disease , mild atrophy. Also advised pt. Was on BIPAP MD agreed , and BP parameters;  SBP btwn 160-180 not to treat unless greater than 180.

## 2017-01-19 NOTE — THERAPY DISCHARGE NOTE
Acute Care - Speech Language Pathology   Swallow Eval/Discharge [facility]     Patient Name: Ranjeet Bajwa  : 1945  MRN: [id number]  Today's Date: 2017  Onset of Illness/Injury or Date of Surgery Date: (P) 17     Referring Physician: REX Tellez (P)      Admit Date: 2017    SPEECH-LANGUAGE PATHOLOGY EVALUATION - SWALLOW  Subjective: The patient was seen on this date for a Clinical Swallow evaluation.  Patient was alert and cooperative.    The patient's history is significant for CVA with failed dysphagia screening.   Objective: Textures given included thin liquid, nectar thick liquid, honey thick liquid, puree consistency and regular consistency.  Assessment: Difficulties were noted with thin liquid and regular consistency, characterized by delayed cough x1; however, no other trials showed signs or symptoms of aspiration.  SLP Findings:  Patient presents with functional swallow, without esophageal component.   Recommendations: Diet Textures: thin liquid, full liquids with upgrade as tolerated. food.  Medications should be taken whole with thin liquids. May have water and ice between meals after oral care, under staff or family supervision and with the recommended strategies for safe swallowing.   Recommended Strategies: Upright for PO and small bites and sips. Oral care before breakfast, after all meals and PRN.  Other Recommended Evaluations: none    Dysphagia therapy is not recommended.   Tess Mckinley, Speech Therapy Student 2017 9:50 AM    Visit Dx:    ICD-10-CM ICD-9-CM   1. NSTEMI (non-ST elevated myocardial infarction) I21.4 410.70   2. Cerebrovascular accident (CVA), unspecified mechanism I63.9 434.91   3. 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   • NSTEMI (non-ST elevated myocardial infarction)     Past Medical History   Diagnosis Date   • Coronary artery disease    • Diabetes mellitus    • Hypertension       Past Surgical History   Procedure Laterality Date   • Cardiac catheterization            SWALLOW EVALUATION (last 72 hours)      Swallow Evaluation       01/19/17 0830 01/18/17 2200             Rehab Evaluation    Document Type (P)  evaluation  - --  -KS       Subjective Information (P)  complains of;nausea/vomiting  -        Symptoms Noted During/After Treatment (P)  none  -        Symptoms Noted Comment (P)  Pt was nauseated when head was first elevated.  -        General Information    Patient Profile Review (P)  yes  -        Onset of Illness/Injury (P)  01/18/17  -        Referring Physician (P)  REX Tellez  -        Current Diet Limitations (P)  NPO  -        Precautions/Limitations, Vision (P)  corrective lenses needed at all times  -        Precautions/Limitations, Hearing (P)  WFL  -        Prior Level of Function- Communication (P)  functional in all spheres  -        Prior Level of Function- Swallowing (P)  no diet consistency restrictions  -        Plans/Goals Discussed With (P)  patient;spouse/S.O.  -        Clinical Impression    Patient's Goals For Discharge (P)  return to all previous roles/activities  -        Family Goals For Discharge (P)  patient able to return to all previous activities/roles  -        Functional Level At Time Of Eval (P)  WNL  -        Criteria for Skilled Therapeutic Interventions Met (P)  no problems identified which require skilled intervention  -        FCM, Swallowing (P)  7-->Level 7  -        Therapy Frequency (P)  evaluation only  -        SLP Diet Recommendation (P)  advance diet as tolerated;thin liquids;regular textures   start with full liquid and advance.  -        SLP Rec. for Method of Medication Administration (P)  meds whole with thin liquid  -        Anticipated Discharge Disposition (P)  home  -        Pain Assessment    Pain Assessment (P)  No/denies pain  -        Oral Motor Structure and Function    Oral  Motor Anatomy and Physiology (P)  patient demonstrates anatomy and physiology that is WNL  -        Dentition Assessment (P)  present and adequate  -        Secretion Management (P)  WNL/WFL  -        Mucosal Quality (P)  moist, healthy  -        Velar Elevation (P)  WNL (within normal limits)  -        Oral Musculature General Assessment (P)  WNL (within normal limits)  -        General Feeding/Swallowing Observations    Current Feeding Method (P)  NPO  -        Clinical Swallow Exam    Mode of Presentation (P)  fed by clinician;spoon;straw;cup  -        Oral Phase Results (P)  intact oral phase without signs of dysfunction  -        Pharyngeal Phase Results (P)  cough  -        Summary of Clinical Exam (P)  Delayed cough with initial thin liquid. Multiple other trials of thin showed no signs of aspiration. Vocal quality remained clear.  -        Swallow Recommendations    Eating Assistance (P)  no assistance needed with self eating  -        Other Recommendations (P)  regular;thin;other (comment)   starting with full liquids, advance as tolerated.  -          User Key  (r) = Recorded By, (t) = Taken By, (c) = Cosigned By    Initials Name Effective Dates    KS Susan Etienne RN 08/02/16 -      Tess Mckinley, Speech Therapy Student 12/19/16 -         EDUCATION  The patient has been educated in the following areas:   Dysphagia (Swallowing Impairment).    SLP Recommendation and Plan     SLP Diet Recommendation: (P) advance diet as tolerated, thin liquids, regular textures (start with full liquid and advance.)     SLP Rec. for Method of Medication Administration: (P) meds whole with thin liquid        Criteria for Skilled Therapeutic Interventions Met: (P) no problems identified which require skilled intervention  Anticipated Discharge Disposition: (P) home     Therapy Frequency: (P) evaluation only             Plan of Care Review  Plan Of Care Reviewed With: (P) patient,  spouse  Progress: (P) improving  Outcome Summary/Follow up Plan: (P) Cough with initial trial of thin liquids. No cough on multiple trials of thin liquids following. Will start a full liquid diet due to nausea and vomiting. Ok to upgrade diet to regular per MD.           SLP Outcome Measures (last 72 hours)      SLP Outcome Measures       01/19/17 0900          SLP Outcome Measures    Outcome Measure Used? (P)  Adult NOMS  -      FCM Scores    FCM Chosen (P)  Swallowing  -      Swallowing FCM Score (P)  7  -        User Key  (r) = Recorded By, (t) = Taken By, (c) = Cosigned By    Initials Name Effective Dates     Tess Mckinley Speech Therapy Student 12/19/16 -            Time Calculation:         Time Calculation- SLP       01/19/17 0946          Time Calculation- [facility]    SLP Start Time (P)  0830  -      SLP Stop Time (P)  0946  -      SLP Time Calculation (min) (P)  76 min  -      SLP Received On (P)  01/19/17  -        User Key  (r) = Recorded By, (t) = Taken By, (c) = Cosigned By    Initials Name Provider Type     Tess Mckinley Speech Therapy Student Speech Therapy Student          Therapy Charges for Today     Code Description Service Date Service Provider Modifiers Qty    [id number] HC ST EVAL ORAL PHARYNG SWALLOW 5 1/19/2017 Tess Mckinley Speech Therapy Student GN 1          SLP G-Codes  SLP NOMS Used?: (P) Yes  Functional Limitations: (P) Swallowing  Swallow Current Status (): (P) 0 percent impaired, limited or restricted  Swallow Goal Status (): (P) 0 percent impaired, limited or restricted  Swallow Discharge Status (): (P) 0 percent impaired, limited or restricted    SLP Discharge Summary  Anticipated Discharge Disposition: (P) home  Reason for Discharge: (P) At baseline function  Outcomes Achieved: (P) Able to achieve all goals within established timeline  Discharge Destination: (P) other (comment) (Still in acute care)    Tess Mckinley Speech Therapy  Student  1/19/2017

## 2017-01-19 NOTE — H&P
[facility]  HISTORY AND PHYSICAL    Date of Admission: 1/18/2017  Primary Care Physician: DALILA Spencer    Subjective     Chief Complaint: Nausea/Vomiting; abnormal speech and weakness    Nausea   Associated symptoms include nausea and vomiting.   Vomiting      Hypertension       Patient is a 71-year-old  male with past medical history significant for hypertension and diabetes that presented to our hospital via EMS after a spell that occurred at a local restaurant.  Family is currently at bedside and provides the majority of additional history, as patient is received 2 doses of Phenergan in the emergency room for multiple episodes of nausea and vomiting.  Patient was in his usual state of health this morning, and went with his spouse to [employer] restaurant for lunch.  After he had eaten, he abruptly had an episode where he felt nauseated, slumped over in the seat, speech was slowed and somewhat slurred, and then began having multiple episodes of nausea and vomiting.  Family denies seeing any facial droop, but does report that his speech was slurred.  They report that he had good hand grasp bilaterally, but when EMS arrived they were concerned about right lower shin many weakness.  He denies having any chest pain.  He reports no shortness of breath.  He currently reports that his symptoms of nausea are better, however he continues to be lethargic after 2 doses of Phenergan.  He has never had similar symptoms like this in the past.  Family reports that he had a cardiac catheterization approximately 20 years ago which evidently revealed no significant abnormality.  Both cardiology and neurology has been consult in the emergency room given his recent constellation of symptoms.  Patient denies having any headache.  He reports no vision change.  He reports no abdominal discomfort.  Nursing reports that he did have urinary retention upon arrival and  had over 1000 mL of urine after catheterization.    Review of Systems   Constitutional: Negative.    HENT: Negative.    Respiratory: Negative.    Cardiovascular: Negative.    Gastrointestinal: Positive for nausea and vomiting.   Endocrine: Negative.    Genitourinary: Positive for difficulty urinating.   Skin: Negative.    Neurological: Negative.    Hematological: Negative.    Psychiatric/Behavioral: Negative.       Otherwise complete ROS reviewed and negative except as mentioned in the HPI.    Past Medical History:   Past Medical History   Diagnosis Date   • Coronary artery disease    • Diabetes mellitus    • Hypertension        Past Surgical History:  Past Surgical History   Procedure Laterality Date   • Cardiac catheterization         Social History:  reports that he has never smoked. He does not have any smokeless tobacco history on file. He reports that he does not drink alcohol or use illicit drugs.    Family History: Patient reports that heart disease runs in the family.    Allergies:  No Known Allergies    Medications:  Prior to Admission medications    Medication Sig Start Date End Date Taking? Authorizing Provider   amLODIPine (NORVASC) 10 MG tablet Take 1 tablet by mouth daily 1/13/17  Yes Historical Provider, MD   brimonidine (ALPHAGAN) 0.2 % ophthalmic solution  10/6/14  Yes Historical Provider, MD   dorzolamide-timolol (COSOPT) 22.3-6.8 MG/ML ophthalmic solution  5/12/16  Yes Historical Provider, MD   fenofibrate 160 MG tablet Take 1 tablet by mouth daily 12/19/16  Yes Historical Provider, MD   glucose blood (EXACTECH TEST) test strip Checking blood sugars 4 times a day--DX E11.9 11/9/15  Yes Historical Provider, MD   hydrochlorothiazide (HYDRODIURIL) 25 MG tablet Take 1 tablet by mouth daily 1/13/17  Yes Historical Provider, MD   insulin aspart (NOVOLOG FLEXPEN) 100 UNIT/ML solution pen-injector sc pen Inject 20 Units into the skin daily DX:250.00 1/13/17  Yes Historical Provider, MD   insulin detemir  "(LEVEMIR FLEXTOUCH) 100 UNIT/ML injection Inject 57 Units into the skin nightly 1/13/17  Yes Historical Provider, MD   Insulin Pen Needle 32G X 4 MM misc 1 each by Does not apply route daily. 3/4/15  Yes Historical Provider, MD   latanoprost (XALATAN) 0.005 % ophthalmic solution  10/30/14  Yes Historical Provider, MD   lisinopril (PRINIVIL,ZESTRIL) 40 MG tablet Take 1 tablet by mouth daily 1/13/17  Yes Historical Provider, MD   metFORMIN (GLUCOPHAGE) 1000 MG tablet Take 1 tablet by mouth 2 times daily (with meals) 1/13/17  Yes Historical Provider, MD   tamsulosin (FLOMAX) 0.4 MG capsule 24 hr capsule TAKE 1 CAPSULE DAILY IN THE MORNING. 5/2/16  Yes Historical Provider, MD   aspirin 81 MG tablet Take 81 mg by mouth daily    Historical Provider, MD   dorzolamide (TRUSOPT) 2 % ophthalmic solution Place 2 drops into both eyes 2 times daily.    Historical Provider, MD   Potassium Gluconate 595 MG capsule Take by mouth    Historical Provider, MD       Objective     Vital Signs:   Visit Vitals   • BP (!) 184/91   • Pulse 79   • Temp 97.3 °F (36.3 °C) (Oral)   • Resp 20   • Ht 70\" (177.8 cm)   • Wt 227 lb (103 kg)   • SpO2 91%   • BMI 32.57 kg/m2     Physical Exam   Constitutional: No distress.   HENT:   Head: Normocephalic and atraumatic.   Mouth/Throat: No oropharyngeal exudate (MM dry).   Conjunctival injection   Eyes: EOM are normal. Pupils are equal, round, and reactive to light. No scleral icterus.   EOM are intact but sluggish   Neck: Normal range of motion. No tracheal deviation present.   Cardiovascular: Normal rate and regular rhythm.    Pulmonary/Chest: Effort normal and breath sounds normal.   Abdominal: Soft. Bowel sounds are normal. He exhibits no distension. There is no tenderness.   Emesis noted on hospital gown   Musculoskeletal: He exhibits no edema or deformity.   Neurological: He is alert. No cranial nerve deficit.   Confused; answers some questions appropriately but incorrectly responds to others. 5/5 " motor strength in bilateral UEs and LEs; EOMs intact but sluggish. Face symmetric.  Speech currently clear but weak and slightly thick-tongued.   Skin: He is not diaphoretic.   Psychiatric: He has a normal mood and affect. His behavior is normal.   Vitals reviewed.      Results Reviewed:  Lab Results (last 24 hours)     Procedure Component Value Units Date/Time    CBC & Differential [[id number]] Collected:  01/18/17 1403    Specimen:  Blood Updated:  01/18/17 1412    Narrative:       The following orders were created for panel order CBC & Differential.  Procedure                               Abnormality         Status                     ---------                               -----------         ------                     CBC Auto Differential[[id number]]         Abnormal            Final result                 Please view results for these tests on the individual orders.    CBC Auto Differential [[id number]]  (Abnormal) Collected:  01/18/17 1403    Specimen:  Blood Updated:  01/18/17 1412     WBC 9.26 10*3/mm3      RBC 5.05 10*6/mm3      Hemoglobin 14.5 g/dL      Hematocrit 42.9 %      MCV 85.0 fL      MCH 28.7 pg      MCHC 33.8 g/dL      RDW 13.5 %      RDW-SD 41.0 fl      MPV 11.3 fL      Platelets 319 10*3/mm3      Neutrophil % 66.1 %      Lymphocyte % 20.5 %      Monocyte % 9.6 %      Eosinophil % 2.6 %      Basophil % 0.4 %      Immature Grans % 0.8 %      Neutrophils, Absolute 6.12 10*3/mm3      Lymphocytes, Absolute 1.90 10*3/mm3      Monocytes, Absolute 0.89 10*3/mm3      Eosinophils, Absolute 0.24 10*3/mm3      Basophils, Absolute 0.04 10*3/mm3      Immature Grans, Absolute 0.07 (H) 10*3/mm3     Protime-INR [[id number]]  (Normal) Collected:  01/18/17 1403    Specimen:  Blood Updated:  01/18/17 1425     Protime 13.2 Seconds      INR 0.97     aPTT [[id number]]  (Normal) Collected:  01/18/17 1403    Specimen:  Blood Updated:  01/18/17 1425     PTT 29.7 seconds     D-dimer, Quantitative [[id number]]  (Abnormal)  Collected:  01/18/17 1403    Specimen:  Blood Updated:  01/18/17 1425     D-Dimer, Quantitative 0.66 (H) mg/L (FEU)     Narrative:       Reference Range is 0-0.50 mg/L FEU. However, results <0.50 mg/L FEU tends to rule out DVT or PE. Results >0.50 mg/L FEU are not useful in predicting absence or presence of DVT or PE.    Comprehensive Metabolic Panel [[id number]]  (Abnormal) Collected:  01/18/17 1403    Specimen:  Blood Updated:  01/18/17 1426     Glucose 262 (H) mg/dL      BUN 17 mg/dL      Creatinine 0.72 mg/dL      Sodium 139 mmol/L      Potassium 3.7 mmol/L      Chloride 101 mmol/L      CO2 26.0 mmol/L      Calcium 9.1 mg/dL      Total Protein 7.7 g/dL      Albumin 4.40 g/dL      ALT (SGPT) 29 U/L      AST (SGOT) 50 (H) U/L      Alkaline Phosphatase 115 U/L      Total Bilirubin 0.7 mg/dL      eGFR Non African Amer 107 mL/min/1.73      Globulin 3.3 gm/dL      A/G Ratio 1.3 g/dL      BUN/Creatinine Ratio 23.6      Anion Gap 12.0 mmol/L     Narrative:       The MDRD GFR formula is only valid for adults with stable renal function between ages 18 and 70.    Amylase [[id number]]  (Normal) Collected:  01/18/17 1403    Specimen:  Blood Updated:  01/18/17 1426     Amylase 64 U/L     Lipase [[id number]]  (Abnormal) Collected:  01/18/17 1403    Specimen:  Blood Updated:  01/18/17 1426     Lipase 276 (H) U/L     Lactic Acid, Plasma [[id number]]  (Normal) Collected:  01/18/17 1403    Specimen:  Blood Updated:  01/18/17 1428     Lactate 1.0 mmol/L     BNP [[id number]]  (Abnormal) Collected:  01/18/17 1403    Specimen:  Blood Updated:  01/18/17 1437     proBNP 1560.0 (H) pg/mL     Urinalysis With / Culture If Indicated [[id number]]  (Abnormal) Collected:  01/18/17 1434    Specimen:  Urine from Urine, Clean Catch Updated:  01/18/17 1450     Color, UA Yellow      Appearance, UA Clear      pH, UA 7.0      Specific Gravity, UA 1.018      Glucose, UA >=1000 mg/dL (3+) (A)      Ketones, UA Negative      Bilirubin, UA Negative      Blood, UA  Negative      Protein,  mg/dL (2+) (A)      Leuk Esterase, UA Negative      Nitrite, UA Negative      Urobilinogen, UA 0.2 E.U./dL     Urinalysis, Microscopic Only [[id number]]  (Abnormal) Collected:  01/18/17 1434    Specimen:  Urine from Urine, Clean Catch Updated:  01/18/17 1450     RBC, UA 3-5 (A) /HPF      WBC, UA 0-2 (A) /HPF      Bacteria, UA None Seen /HPF      Squamous Epithelial Cells, UA 0-2 /HPF      Hyaline Casts, UA None Seen /LPF      Methodology Automated Microscopy     POC Troponin, Rapid [[id number]]  (Abnormal) Collected:  01/18/17 1442    Specimen:  Blood Updated:  01/18/17 1455     Troponin I 2.55 (C) ng/mL       Serial Number: [id number]    : [id number]       Procalcitonin [[id number]]  (Normal) Collected:  01/18/17 1403    Specimen:  Blood Updated:  01/18/17 1500     Procalcitonin <0.25 ng/mL     Narrative:       SIRS, sepsis, severe sepsis, and septic shock are categorized according to the criteria of the consensus conference of the American College of Chest Physicians/Society of Critical Care Medicine.    PCT < 0.5 ng/mL     Systemic infection (sepsis) is not likely.    PCT >0.5 and < 2.0 ng/mL Systemic infection (sepsis) is possible, but other conditions are known to elevate PCT as well.    PCT > 2.0 ng/mL     Systemic infection (sepsis) is likely, unless other causes are known.      PCT > 10.0 ng/mL    Important systemic inflammatory response, almost exclusively due to severe bacterial sepsis or septic shock.    PCT values of < 0.5 ng/mL do not exclude an infection, because localized infections (without systemic signs) may be associated with such low concentrations, or a systemic infection in its initial stages (<6 hours).  Increased PCT can occur without infection.  PCT concentrations between 0.5 and 2.0 ng/mL should be interpreted taking into account the patients history.  It is recommended to retest PCT within 6-24 hours if any concentrations < 2.0 ng/mL are obtained.    Myoglobin,  Serum [[id number]]  (Normal) Collected:  01/18/17 1403    Specimen:  Blood Updated:  01/18/17 1725     Myoglobin 26.2 ng/mL     CK [[id number]]  (Normal) Collected:  01/18/17 1403    Specimen:  Blood Updated:  01/18/17 1730     Creatine Kinase 115 U/L     CK-MB [[id number]]  (Normal) Collected:  01/18/17 1403    Specimen:  Blood Updated:  01/18/17 1730     CKMB 2.87 ng/mL     Narrative:       CKMB Index not indicated    POC Glucose Fingerstick [[id number]]  (Abnormal) Collected:  01/18/17 1731    Specimen:  Blood Updated:  01/18/17 1743     Glucose 189 (H) mg/dL       : [id number] [facility]eter ID: [id number]       POC Troponin, Rapid [[id number]]  (Abnormal) Collected:  01/18/17 1731    Specimen:  Blood Updated:  01/18/17 1745     Troponin I 1.94 (C) ng/mL       Serial Number: [id number]    : [id number]           Imaging Results (last 24 hours)     Procedure Component Value Units Date/Time    CT Head Without Contrast [[id number]] Collected:  01/18/17 1409     Updated:  01/18/17 1413    Narrative:       EXAMINATION: CT HEAD WO CONTRAST-      1/18/2017 2:00 PM CST     HISTORY: stroke symptoms     In order to have a CT radiation dose as low as reasonably achievable  Automated Exposure Control was utilized for adjustment of the mA and/or  KV according to patient size.     DLP in mGycm= 776.     Code stroke protocol noncontrast head CT.     Mucosal thickening with opacification of the left side of the sphenoid  sinus.  Trace amount of air in the left cavernous sinus region is indeterminate.     Ventricle size is appropriate.     There is no acute hemorrhage. No cortical infarct is seen.  Mild periventricular white matter small vessel disease.     Summary:  1. No acute intracranial abnormality.  2. Negative head CT report called to Dr. Daniel in the emergency  room at 2:09 PM                       Electronically Signed By-Dr. Uche Major MD On:1/18/2017 3:11 PM EST  This report was finalized on 01/18/2017 14:11 by   Uche Major MD.    XR Chest 1 View [[id number]] Collected:  01/18/17 1459     Updated:  01/18/17 1502    Narrative:       EXAMINATION: XR CHEST 1 VW-     1/18/2017 2:31 PM CST     HISTORY: mental status change     1 view chest x-ray with no comparison.     Limited visualization of the retrocardiac left lower lobe.     Magnified heart size.     The visualized portion of the lungs show no focal infiltrate and no  pneumothorax.     Electronically Signed By-Dr. cUhe Major MD On:1/18/2017 4:00 PM EST  This report was finalized on 01/18/2017 15:00 by Dr. Uche Major MD.    CT Abdomen Pelvis With Contrast [[id number]] Collected:  01/18/17 1619     Updated:  01/18/17 1627    Narrative:       CT ABDOMEN PELVIS W CONTRAST- 1/18/2017 16:00 CST     HISTORY: protracted vomiting       COMPARISON: None.      DOSE LENGTH PRODUCT: 845 mGy cm. Automated exposure control was also  utilized to decrease patient radiation dose.     TECHNIQUE: Following the intravenous administration of contrast, helical  CT tomographic images of the abdomen and pelvis were acquired. Coronal  reformatted images were also provided for review.      FINDINGS:   Findings in the lower chest are described in a separate dictation.      LIVER: No focal liver lesion. The hepatic vasculature is patent.      BILIARY SYSTEM: Multiple stones are seen in the gallbladder. There is no  biliary ductal dilatation or acute cholecystitis.      PANCREAS: No focal pancreatic lesion.      SPLEEN: A well-defined region of hypoenhancement in the splenic body is  noted. This is likely due to a splenic infarct.      KIDNEYS AND ADRENALS: Bilateral renal stones are present. The ureters  are prominent, but no obstructing stones are seen in the ureters. There  is no hydronephrosis. The adrenal glands are normal in appearance. Mild  bilateral perinephric stranding is present.     RETROPERITONEUM: No mass, lymphadenopathy or hemorrhage.      GI TRACT: A moderate-sized duodenal  diverticulum is noted. A moderate  amount of stool is seen throughout the sigmoid colon, and the stomach is  mildly distended. The appendix is not visualized.     OTHER: There is no mesenteric mass, lymphadenopathy or fluid collection.  The abdominopelvic vasculature is patent. The osseous structures and  soft tissues demonstrate no worrisome lesions.          PELVIS: Bilateral fat-containing inguinal hernias are present. The  bladder is mildly dilated. The prostate is moderately enlarged.       Impression:       1. Small area of splenic infarction. This is likely acute.  2. Bilateral renal stones without evidence of obstructive urolithiasis.  3. Mild prominence of the ureters could be due to bladder distention and  prostatomegaly.  4. Mild constipation.      Electronically Signed By-Dr. Jacoby Ambrocio MD On:1/18/2017 5:22 PM  EST  This report was finalized on 01/18/2017 16:22 by Dr. Jacoby Ambrocio MD.    CT Angiogram Chest With Contrast [[id number]] Collected:  01/18/17 1625     Updated:  01/18/17 1630    Narrative:       CT ANGIOGRAM CHEST W CONTRAST- 1/18/2017 16:00 CST      HISTORY: Elevated d-dimer and nausea and vomiting      COMPARISON: None.      DOSE LENGTH PRODUCT: 481 mGy cm. Automated exposure control was also  utilized to decrease patient radiation dose.     TECHNIQUE: Helical tomographic images of the chest were obtained after  the administration of intravenous contrast following angiogram protocol.  Additionally, 3D reformatted images were provided.        FINDINGS:    Pulmonary arteries: There is adequate enhancement of the pulmonary  arteries to evaluate for central and segmental pulmonary emboli. There  are no filling defects within the main, lobar, segmental or visualized  subsegmental pulmonary arteries. The pulmonary arteries are within  normal limits for size.      Aorta and great vessels: The aorta is well opacified and demonstrates no  dissection or aneurysm. The great vessels are normal in  appearance.  Coronary artery calcifications are present.     Visualized neck base: The imaged portion of the base of the neck appears  unremarkable.      Lungs: Dependent changes are noted in bilateral lung bases. The lungs  are otherwise clear. There is no pleural effusion. The trachea and  bronchial tree are patent.      Heart: The heart is enlarged, and there is hypertrophy of the LEFT  ventricular wall. There is no pericardial effusion.      Mediastinum and lymph nodes: No enlarged mediastinal, hilar, or axillary  lymph nodes are present.      Skeletal and soft tissues: The osseous structures of the thorax and  surrounding soft tissues demonstrate no acute process.     Upper abdomen: Findings in the upper abdomen are described in a separate  dictation.        Impression:       1. No evidence of pulmonary embolus or other acute cardiopulmonary  process.  2. Coronary artery calcifications are noted.  3. Mild cardiomegaly and LEFT ventricular hypertrophy.  4. Findings in the upper abdomen are described in a separate dictation.     Electronically Signed By-Dr. Jacoby Ambrocio MD On:1/18/2017 5:27 PM  EST  This report was finalized on 01/18/2017 16:27 by Dr. Jacoby Ambrocio MD.          I have personally reviewed and interpreted the radiology studies and ECG obtained at time of admission.     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  Possible TIA vs. CVA  2.  Elevated troponin and possible NSTEMI  3.  Nausea and vomiting  4.  DMII - insulin dependent  5.  HTN - poorly controlled  6.  Urinary Retention - >1000ml after catheterization in ED  7.  Splenic Infarct on CT A/P  8.  Constipation - mild per CT  9.  LENA    Plan:  1.  MRI Brain  2.  CTA Head and Neck per Neuro  3.  Echo  4.  Appreciate Cards and Neuro  5.  Serial CMs  6.  ASA/statin  7.  IV Labetalol PRN per parameters  8.  Neuro  checks  9.  Telemetry; serial EKG  10.  Lovenox at PPx dose (for now)  11.  IVFs  12.  NPO for now  13.  PT/OT/ST eval  14.  IV anti-emetics PRN  15.  Splenic infarct noted on CAT scan of the abdomen, and we will have to monitor closely.  Etiology unclear.  Possible complication of atherosclerotic disease? Serial abdominal exams.  16.  Workup ongoing    Code Status: Full Code     I discussed the patients findings and my recommendations with Patient and family at bedside        Kody Tellez MD   01/18/17   6:00 PM

## 2017-01-20 NOTE — PROCEDURES
Intubation  Date/Time: 1/20/2017 10:19 AM  Performed by: RADHA PRASAD  Authorized by: RADHA PRASAD   Consent: The procedure was performed in an emergent situation. Verbal consent not obtained. Written consent not obtained.  Intubation method: video-assisted  Patient status: paralyzed (RSI)  Preoxygenation: BVM  Sedatives: etomidate  Paralytic: rocuronium  Laryngoscope size: Cabrera 3  Tube size: 7.5 mm  Tube type: cuffed  Number of attempts: 2  Ventilation between attempts: BVM  Cricoid pressure: yes  Cords visualized: yes  Post-procedure assessment: ETCO2 monitor,  CO2 detector and chest rise  Breath sounds: equal  Cuff inflated: yes  ETT to lip: 23 cm  Tube secured with: ETT bose and adhesive tape      The patient had a respiratory arrest in the CT scanner this morning.  He is admitted to Dr. Jose Tellez.  He likely has hemorrhagic conversion of a stroke.  When I came into the room the patient was vomiting and rolled on his left side.  Nursing staff as well as respiratory therapy was also immediately available.  Dr. Tellez entered the room and tried to place a right femoral central venous catheter.  See his notes for his description.    We cleared the patient's airway.  Dr. Self had shown up from the emergency department and had a rapid sequence kit.  The patient was given etomidate and rocuronium.  I then tried to visualize the patient's cords with a conventional laryngoscope, Chandraaknt 4 blade.  The patient's airway was very anterior and he had a large tongue.  Eventually respiratory therapy was able to produce a Magrath videoscope.  I was able to visualize his cords without any difficulty at this point.  I then placed a Eschmann through his tracheal orifice and then passed a 7-1/2 cm endotracheal tube over the Eschmann in the Eschmann withdrawn.    The procedure is otherwise as described above.    A stat 1 view chest x-ray has been ordered. We will confirm tube placement.  The patient is now back in  ICU bed 4.  Dr. Tellez is conferring with the patient's wife.    Deric Marie DO  01/20/17  10:25 AM

## 2017-01-20 NOTE — PLAN OF CARE
Problem: Patient Care Overview (Adult)  Goal: Plan of Care Review  Outcome: Ongoing (interventions implemented as appropriate)    01/20/17 0426   Coping/Psychosocial Response Interventions   Plan Of Care Reviewed With patient;family   Patient Care Overview   Progress declining   Outcome Evaluation   Outcome Summary/Follow up Plan Pt was very restless last night and disoriented place and time. Pt is also tachycardic HR 100s-119       Goal: Adult Individualization and Mutuality  Outcome: Ongoing (interventions implemented as appropriate)  Goal: Discharge Needs Assessment  Outcome: Ongoing (interventions implemented as appropriate)    Problem: Acute Coronary Syndrome (ACS) (Adult)  Goal: Signs and Symptoms of Listed Potential Problems Will be Absent or Manageable (Acute Coronary Syndrome)  Outcome: Ongoing (interventions implemented as appropriate)    Problem: Stroke (Ischemic) (Adult)  Goal: Signs and Symptoms of Listed Potential Problems Will be Absent or Manageable (Stroke)  Outcome: Ongoing (interventions implemented as appropriate)

## 2017-01-20 NOTE — CONSULTS
Referring Provider: @REFPROV    Reason for Consultation:  Mr. Bajwa suffered a code while having a  CAT scan in early this morning I have been asked to see the patient at this point    Patient Care Team:  DALILA Boyd as PCP - General (Family Medicine)  Yadira Goodman MD as Consulting Physician (Neurology)      Subjective .     Chief complaint/History of present illness:  Mr. Bajwa developed acute onset of nausea vomiting and that speech disturbance yesterday.  He was shown to have cerebellar infarctions bilaterally.  He had been in intensive care unit.  He was awake and alert yesterday.  He and his wife made well.  This morning he had sudden acute change.  A CAT scan was carried out and he had inspiratory arrest.  Placed on a ventilator.  His blood pressure had been recorded at 221 systolic.  He has been on Lovenox.  He has had no response since the code that occurred in the CAT scan unit.  He had had no prior neurological symptoms preceding the the event yesterday apparently    Review of Systems  Review of Systems   Constitutional: Negative.    HENT: Negative.    Eyes: Negative.    Respiratory: Negative.    Cardiovascular: Negative.    Gastrointestinal: Positive for vomiting.   Endocrine: Negative.         Diabetes mellitus   Genitourinary: Negative.    Musculoskeletal: Negative.    Skin: Negative.    Allergic/Immunologic: Negative.    Neurological: Positive for speech difficulty.   Hematological: Negative.    Psychiatric/Behavioral: Negative.    All other systems reviewed and are negative.      History  Past Medical History   Diagnosis Date   • Coronary artery disease    • Diabetes mellitus    • Hypertension    ,   Past Surgical History   Procedure Laterality Date   • Cardiac catheterization     , History reviewed. No pertinent family history.,   Social History   Substance Use Topics   • Smoking status: Never Smoker   • Smokeless tobacco: None   • Alcohol use No   ,   Prescriptions Prior to  Admission   Medication Sig Dispense Refill Last Dose   • amLODIPine (NORVASC) 10 MG tablet Take 1 tablet by mouth daily      • aspirin 81 MG tablet Take 81 mg by mouth daily      • brimonidine (ALPHAGAN) 0.2 % ophthalmic solution       • dorzolamide-timolol (COSOPT) 22.3-6.8 MG/ML ophthalmic solution       • fenofibrate 160 MG tablet Take 1 tablet by mouth daily      • glucose blood (EXACTECH TEST) test strip Checking blood sugars 4 times a day--DX E11.9      • hydrochlorothiazide (HYDRODIURIL) 25 MG tablet Take 1 tablet by mouth daily      • insulin aspart (NOVOLOG FLEXPEN) 100 UNIT/ML solution pen-injector sc pen Inject 20 Units into the skin daily DX:250.00      • insulin detemir (LEVEMIR FLEXTOUCH) 100 UNIT/ML injection Inject 57 Units into the skin nightly      • Insulin Pen Needle 32G X 4 MM misc 1 each by Does not apply route daily.      • latanoprost (XALATAN) 0.005 % ophthalmic solution       • lisinopril (PRINIVIL,ZESTRIL) 40 MG tablet Take 1 tablet by mouth daily      • metFORMIN (GLUCOPHAGE) 1000 MG tablet Take 1 tablet by mouth 2 times daily (with meals)      • tamsulosin (FLOMAX) 0.4 MG capsule 24 hr capsule TAKE 1 CAPSULE DAILY IN THE MORNING.      • dorzolamide (TRUSOPT) 2 % ophthalmic solution Place 2 drops into both eyes 2 times daily.      • Potassium Gluconate 595 MG capsule Take by mouth       and Allergies:  Review of patient's allergies indicates no known allergies.    Objective     Vital Signs   Temp:  [97.4 °F (36.3 °C)-98.4 °F (36.9 °C)] 98.4 °F (36.9 °C)  Heart Rate:  [] 99  Resp:  [8-25] 25  BP: (123-221)/() 164/73    Physical Exam:  Physical Exam  comatose on a ventilator no sign of trauma about head or neck.  Breath sounds were clear bilateral heart tones somewhat distant I could not hear specific murmur or bruit.  Abdomen obese.  No deformity of extremities.  His pupils are mid range nonreactive nonresponsive.  No corneal release flex no ciliospinal reflex no gag reflex no  motor response to painful stimulation and areflexia.    Results Review:  Lab Results (last 24 hours)     Procedure Component Value Units Date/Time    POC Glucose Fingerstick [[id number]]  (Abnormal) Collected:  01/19/17 1137    Specimen:  Blood Updated:  01/19/17 1149     Glucose 296 (H) mg/dL       : [id number] DeLleonely PamelaMeter ID: [id number]       POC Glucose Fingerstick [[id number]]  (Abnormal) Collected:  01/19/17 1749    Specimen:  Blood Updated:  01/19/17 1805     Glucose 239 (H) mg/dL       : [id number] DeLargey PamelaMeter ID: [id number]       POC Glucose Fingerstick [[id number]]  (Abnormal) Collected:  01/19/17 2019    Specimen:  Blood Updated:  01/19/17 2033     Glucose 254 (H) mg/dL       : [id number] Sood RihanaMeter ID: [id number]       POC Glucose Fingerstick [[id number]]  (Abnormal) Collected:  01/19/17 2327    Specimen:  Blood Updated:  01/19/17 2339     Glucose 230 (H) mg/dL       : [id number] Chester Gant MayMeter ID: [id number]       CBC (No Diff) [[id number]]  (Abnormal) Collected:  01/20/17 0152    Specimen:  Blood Updated:  01/20/17 0208     WBC 17.03 (H) 10*3/mm3      RBC 4.93 10*6/mm3      Hemoglobin 14.5 g/dL      Hematocrit 41.6 %      MCV 84.4 fL      MCH 29.4 pg      MCHC 34.9 g/dL      RDW 13.7 %      RDW-SD 41.8 fl      MPV 11.0 fL      Platelets 338 10*3/mm3     Lipase [[id number]]  (Abnormal) Collected:  01/20/17 0152    Specimen:  Blood Updated:  01/20/17 0228     Lipase 254 (H) U/L     Comprehensive Metabolic Panel [[id number]]  (Abnormal) Collected:  01/20/17 0152    Specimen:  Blood Updated:  01/20/17 0228     Glucose 235 (H) mg/dL      BUN 19 mg/dL      Creatinine 0.72 mg/dL      Sodium 144 mmol/L      Potassium 3.3 (L) mmol/L      Chloride 107 mmol/L      CO2 22.0 (L) mmol/L      Calcium 8.9 mg/dL      Total Protein 7.2 g/dL      Albumin 4.00 g/dL      ALT (SGPT) 22 U/L      AST (SGOT) 28 U/L      Alkaline Phosphatase 65 U/L      Total Bilirubin 0.7 mg/dL      eGFR Non   Amer 107 mL/min/1.73      Globulin 3.2 gm/dL      A/G Ratio 1.3 g/dL      BUN/Creatinine Ratio 26.4 (H)      Anion Gap 15.0 (H) mmol/L     Narrative:       The MDRD GFR formula is only valid for adults with stable renal function between ages 18 and 70.    POC Glucose Fingerstick [[id number]]  (Abnormal) Collected:  01/20/17 0538    Specimen:  Blood Updated:  01/20/17 0557     Glucose 243 (H) mg/dL       : [id number] Chester Roberto ID: [id number]       Urine Culture [[id number]]  (Normal) Collected:  01/18/17 1434    Specimen:  Urine from Urine, Clean Catch Updated:  01/20/17 0631     Urine Culture No growth at 24 hours     Blood Gas, Arterial With Co-Ox [[id number]]  (Abnormal) Collected:  01/20/17 0924    Specimen:  Arterial Blood Updated:  01/20/17 0926     Site Arterial: right radial      Ish's Test --       Documented in Rapid Comm        pH, Arterial 7.489 (H) pH units      pCO2, Arterial 35.3 mm Hg      pO2, Arterial 76.2 (L) mm Hg      HCO3, Arterial 26.2 (H) mmol/L      Base Excess, Arterial 3.2 (H) mmol/L      Hemoglobin, Blood Gas 14.4 g/dL      Hematocrit, Blood Gas 42.0 %      Oxyhemoglobin 94.7 %      Methemoglobin 0.5 %      Carboxyhemoglobin 1.0 %      Sodium, Arterial 142.7 mmol/L      Potassium, Arterial 3.13 (L) mmol/L      Barometric Pressure for Blood Gas -- mmHg       Component not reported at this site.        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  He is not a surgical candidate.  I have discussed this with  his physician and his wife.  In fact she and the patient had filled out forms yesterday for a medical directive or living well.  She does not wish to extend mechanical support beyond the time that the patient's children may attend his presence.  There is nothing that can be done to change his clinical course;  no surgery is indicated at this point    I discussed the patients findings and my recommendations with    Bhavesh Marino MD  01/20/17  10:36 AM

## 2017-01-20 NOTE — PROGRESS NOTES
Neurology Progress Note      Chief Complaint:  nausea    Subjective     Subjective:  The patient became agitated overnight requiring some sedation.  This morning he became extremely somnolent and was taken to CT scanner.  In the CT scanner the patient suffered a cardiac arrest.  A code BLUE was called and ROS was achieved.  A stat head CT showed posterior fossa herniation with hemorrhagic conversion of previous stroke.    Medications: Reviewed  Current Facility-Administered Medications   Medication Dose Route Frequency Provider Last Rate Last Dose   • acetaminophen (TYLENOL) tablet 650 mg  650 mg Oral Q4H PRN Kody Tellez MD   650 mg at 01/19/17 1625   • aspirin tablet 325 mg  325 mg Oral Daily Yadira Goodman MD   325 mg at 01/19/17 1024   • atorvastatin (LIPITOR) tablet 80 mg  80 mg Oral Nightly Kody Tellez MD   80 mg at 01/19/17 2019   • brimonidine (ALPHAGAN) 0.2 % ophthalmic solution 1 drop  1 drop Both Eyes TID Kody Tellez MD   1 drop at 01/20/17 0834   • dextrose (D50W) solution 25 g  25 g Intravenous Q15 Min PRN Kody Tellez MD       • dextrose (GLUTOSE) oral gel 15 g  15 g Oral Q15 Min PRN Kody Tellez MD       • dorzolamide-timolol (COSOPT) ophthalmic solution 1 drop  1 drop Both Eyes BID Kody Tellez MD   1 drop at 01/20/17 0834   • enoxaparin (LOVENOX) syringe 40 mg  40 mg Subcutaneous Daily Kody Tellez MD   40 mg at 01/20/17 0837   • glucagon (human recombinant) (GLUCAGEN DIAGNOSTIC) injection 1 mg  1 mg Subcutaneous Q15 Min PRN Kody Tellez MD       • hydrALAZINE (APRESOLINE) injection 10 mg  10 mg Intravenous Q2H PRN Vijay Mejía MD   10 mg at 01/20/17 0604   • insulin detemir (LEVEMIR) injection 30 Units  30 Units Subcutaneous Nightly Kody Tellez MD       • insulin lispro (humaLOG) injection 2-9 Units  2-9 Units Subcutaneous Q6H Kody Tellez MD       • labetalol (NORMODYNE,TRANDATE) injection 10 mg  10 mg  Intravenous Q4H PRN Kody Tellez MD       • latanoprost (XALATAN) 0.005 % ophthalmic solution 1 drop  1 drop Both Eyes Nightly Kody Tellez MD   1 drop at 01/19/17 2019   • LORazepam (ATIVAN) injection 0.5 mg  0.5 mg Intravenous Q30 Min PRN Yadira Goodman MD   0.5 mg at 01/20/17 0143   • niCARdipine (CARDENE) 25 mg/250 mL 0.9% NS  5-15 mg/hr Intravenous Titrated Kody Tellez MD       • niCARdipine (CARDENE) 25 mg/250 mL 0.9% NS  5-15 mg/hr Intravenous Titrated Ascencion Riley MD       • ondansetron (ZOFRAN) injection 4 mg  4 mg Intravenous Q6H PRN Kody Tellez MD   4 mg at 01/19/17 0918   • potassium chloride 10 mEq in 50 mL IVPB  10 mEq Intravenous Q1H Kody Tellez MD       • promethazine (PHENERGAN) injection 12.5 mg  12.5 mg Intravenous Q6H PRN Kody Tellez MD   12.5 mg at 01/19/17 1623   • sodium chloride 0.9 % flush 1-10 mL  1-10 mL Intravenous PRN Kody Tellez MD       • sodium chloride 0.9 % flush 1-10 mL  1-10 mL Intravenous PRN Kody Tellez MD       • sodium chloride 0.9 % flush 10 mL  10 mL Intravenous PRN Demi Daniel MD           Review of Systems:   -A 14 point review of systems is completed and is negative except for nausea and discomfort due to [place]      Objective      Vital Signs  Temp:  [97.4 °F (36.3 °C)-98.4 °F (36.9 °C)] 98.4 °F (36.9 °C)  Heart Rate:  [] 99  Resp:  [8-25] 25  BP: (123-221)/() 164/73    Physical Exam:    HEENT:  Neck supple  CV:  Regular rate and rhythm.  No murmurs  Lungs:  Clear to auscultation  Abdomen:  Non-tender, Non-distended  Extremities:  No signs of peripheral edema    Neurologic Exam:  No response to external stimulation  Gag neg  Pupillary response absent  Corneal reflex absent  No spontaneous movement  No withdraw to painful stim  No tremors    -Results Review:    BRAIN MRI (images reviewed independently)  1. Acute bilateral cerebellar infarcts that are fairly large in  size.  2. Chronic ischemic white matter disease involving the cerebral  hemispheres.  3. Mild atrophy.  4. Mucosal inflammatory changes involving the paranasal sinuses.    CT Head from 1/20:  Significant posterior fossa herniation with hemorrhagic conversion.  Obliteration of 4th ventricle.      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  Cerebellar strokes  2.  Hemorrhagic conversion of strokes  3.  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    Recommendations      Will start Cardene ggt as patient currently has malignant hypertension and will hopefully prevent hematoma expansion while family attempts to fly in from out of town.    Will pursue comfort care consistent with family's wishes.    Ascencion Riley MD  01/20/17  10:40 AM

## 2017-01-20 NOTE — PROGRESS NOTES
Continued Stay Note   [facility]     Patient Name: Ranjeet Bajwa  MRN: [id number]  Today's Date: 1/20/2017    Admit Date: 1/18/2017          Discharge Plan       01/20/17 1124    Case Management/Social Work Plan    Plan comfort care    Patient/Family In Agreement With Plan yes    Additional Comments Note family has elected comfort care when other family members arrive from out of town.  Will assist as needed.              Discharge Codes     None            JESSICA Valladares

## 2017-01-20 NOTE — PROGRESS NOTES
[facility] Medicine Services  INPATIENT PROGRESS NOTE    Length of Stay: 2  Date of Admission: 1/18/2017  Primary Care Physician: DALILA Spencer    Subjective   Chief Complaint: Patient currently unresponsive  Nausea   Associated symptoms include nausea and vomiting.   Vomiting      Hypertension     Patient is currently unresponsive.  Evidently he was agitated and restless overnight and received 2 different doses of both Ativan and Benadryl (totaling 25 mg of Benadryl and 1 mg of Ativan intravenously).  Currently, I cannot get patient to open his eyes or follow any commands.  He is on CPAP therapy which he wears at night.  He will barely flinch at noxious stimuli to finger bed and sternal rub.    Review of Systems   Gastrointestinal: Positive for nausea and vomiting.        All pertinent negatives and positives are as above. All other systems have been reviewed and are negative unless otherwise stated.     Objective    Temp:  [97.4 °F (36.3 °C)-98.4 °F (36.9 °C)] 98.4 °F (36.9 °C)  Heart Rate:  [] 99  Resp:  [8-25] 25  BP: (123-221)/() 164/73  Physical Exam   Constitutional:   Unresponsive; on CPAP   HENT:   Head: Normocephalic and atraumatic.   Mouth/Throat: No oropharyngeal exudate.   Eyes: Pupils are equal, round, and reactive to light. No scleral icterus.   Neck: No tracheal deviation present.   Cardiovascular: Normal rate and regular rhythm.    Pulmonary/Chest: Effort normal and breath sounds normal.   On CPAP currently   Abdominal: Soft. Bowel sounds are normal. Tenderness: a little protuberant.   Musculoskeletal: He exhibits no edema.   Neurological:   I cannot get patient to respond to me at all this morning.  He won't open his eyes.  He won't follow commands.  He barely will flinch and withdraw to noxious stimuli to finger bed and sternal rub.  PERRL at this time.  Seems to occasionally spontaneous move his lower extremities, but won't do this to  command.   Vitals reviewed.    Results Review:  I have reviewed the labs, radiology results, and diagnostic studies.    Laboratory Data:     Results from last 7 days  Lab Units 01/20/17  0152 01/19/17 0330 01/18/17 1954   WBC 10*3/mm3 17.03* 11.79* 12.26*   HEMOGLOBIN g/dL 14.5 13.6* 14.4   HEMATOCRIT % 41.6 38.9* 41.3   PLATELETS 10*3/mm3 338 322 327          Results from last 7 days  Lab Units 01/20/17  0152 01/19/17 0330 01/18/17 1954   SODIUM mmol/L 144 140 136   POTASSIUM mmol/L 3.3* 3.5 3.5   CHLORIDE mmol/L 107 103 100   TOTAL CO2 mmol/L 22.0* 25.0 22.0*   BUN mg/dL 19 14 15   CREATININE mg/dL 0.72 0.73 0.67   CALCIUM mg/dL 8.9 8.6 8.8   BILIRUBIN mg/dL 0.7 0.5 0.8   ALK PHOS U/L 65 64 92   ALT (SGPT) U/L 22 32 30   AST (SGOT) U/L 28 28 39   GLUCOSE mg/dL 235* 281* 284*     Radiology Data:   Imaging Results (last 24 hours)     Procedure Component Value Units Date/Time    CT Head Without Contrast [[id number]] Collected:  01/18/17 1409     Updated:  01/18/17 1413    Narrative:       EXAMINATION: CT HEAD WO CONTRAST-      1/18/2017 2:00 PM CST     HISTORY: stroke symptoms     In order to have a CT radiation dose as low as reasonably achievable  Automated Exposure Control was utilized for adjustment of the mA and/or  KV according to patient size.     DLP in mGycm= 776.     Code stroke protocol noncontrast head CT.     Mucosal thickening with opacification of the left side of the sphenoid  sinus.  Trace amount of air in the left cavernous sinus region is indeterminate.     Ventricle size is appropriate.     There is no acute hemorrhage. No cortical infarct is seen.  Mild periventricular white matter small vessel disease.     Summary:  1. No acute intracranial abnormality.  2. Negative head CT report called to Dr. Daniel in the emergency  room at 2:09 PM                       Electronically Signed By-Dr. Uche Major MD On:1/18/2017 3:11 PM EST  This report was finalized on 01/18/2017 14:11 by Dr. Ivey  MD Pedrito.    XR Chest 1 View [[id number]] Collected:  01/18/17 1459     Updated:  01/18/17 1502    Narrative:       EXAMINATION: XR CHEST 1 VW-     1/18/2017 2:31 PM CST     HISTORY: mental status change     1 view chest x-ray with no comparison.     Limited visualization of the retrocardiac left lower lobe.     Magnified heart size.     The visualized portion of the lungs show no focal infiltrate and no  pneumothorax.     Electronically Signed By-Dr. Uche Major MD On:1/18/2017 4:00 PM EST  This report was finalized on 01/18/2017 15:00 by Dr. Uche Major MD.    CT Abdomen Pelvis With Contrast [[id number]] Collected:  01/18/17 1619     Updated:  01/18/17 1627    Narrative:       CT ABDOMEN PELVIS W CONTRAST- 1/18/2017 16:00 CST     HISTORY: protracted vomiting       COMPARISON: None.      DOSE LENGTH PRODUCT: 845 mGy cm. Automated exposure control was also  utilized to decrease patient radiation dose.     TECHNIQUE: Following the intravenous administration of contrast, helical  CT tomographic images of the abdomen and pelvis were acquired. Coronal  reformatted images were also provided for review.      FINDINGS:   Findings in the lower chest are described in a separate dictation.      LIVER: No focal liver lesion. The hepatic vasculature is patent.      BILIARY SYSTEM: Multiple stones are seen in the gallbladder. There is no  biliary ductal dilatation or acute cholecystitis.      PANCREAS: No focal pancreatic lesion.      SPLEEN: A well-defined region of hypoenhancement in the splenic body is  noted. This is likely due to a splenic infarct.      KIDNEYS AND ADRENALS: Bilateral renal stones are present. The ureters  are prominent, but no obstructing stones are seen in the ureters. There  is no hydronephrosis. The adrenal glands are normal in appearance. Mild  bilateral perinephric stranding is present.     RETROPERITONEUM: No mass, lymphadenopathy or hemorrhage.      GI TRACT: A moderate-sized duodenal  diverticulum is noted. A moderate  amount of stool is seen throughout the sigmoid colon, and the stomach is  mildly distended. The appendix is not visualized.     OTHER: There is no mesenteric mass, lymphadenopathy or fluid collection.  The abdominopelvic vasculature is patent. The osseous structures and  soft tissues demonstrate no worrisome lesions.          PELVIS: Bilateral fat-containing inguinal hernias are present. The  bladder is mildly dilated. The prostate is moderately enlarged.       Impression:       1. Small area of splenic infarction. This is likely acute.  2. Bilateral renal stones without evidence of obstructive urolithiasis.  3. Mild prominence of the ureters could be due to bladder distention and  prostatomegaly.  4. Mild constipation.      Electronically Signed By-Dr. Jacoby Ambrocio MD On:1/18/2017 5:22 PM  EST  This report was finalized on 01/18/2017 16:22 by Dr. Jacoby Ambrocio MD.    CT Angiogram Chest With Contrast [[id number]] Collected:  01/18/17 1625     Updated:  01/18/17 1630    Narrative:       CT ANGIOGRAM CHEST W CONTRAST- 1/18/2017 16:00 CST      HISTORY: Elevated d-dimer and nausea and vomiting      COMPARISON: None.      DOSE LENGTH PRODUCT: 481 mGy cm. Automated exposure control was also  utilized to decrease patient radiation dose.     TECHNIQUE: Helical tomographic images of the chest were obtained after  the administration of intravenous contrast following angiogram protocol.  Additionally, 3D reformatted images were provided.        FINDINGS:    Pulmonary arteries: There is adequate enhancement of the pulmonary  arteries to evaluate for central and segmental pulmonary emboli. There  are no filling defects within the main, lobar, segmental or visualized  subsegmental pulmonary arteries. The pulmonary arteries are within  normal limits for size.      Aorta and great vessels: The aorta is well opacified and demonstrates no  dissection or aneurysm. The great vessels are normal in  appearance.  Coronary artery calcifications are present.     Visualized neck base: The imaged portion of the base of the neck appears  unremarkable.      Lungs: Dependent changes are noted in bilateral lung bases. The lungs  are otherwise clear. There is no pleural effusion. The trachea and  bronchial tree are patent.      Heart: The heart is enlarged, and there is hypertrophy of the LEFT  ventricular wall. There is no pericardial effusion.      Mediastinum and lymph nodes: No enlarged mediastinal, hilar, or axillary  lymph nodes are present.      Skeletal and soft tissues: The osseous structures of the thorax and  surrounding soft tissues demonstrate no acute process.     Upper abdomen: Findings in the upper abdomen are described in a separate  dictation.        Impression:       1. No evidence of pulmonary embolus or other acute cardiopulmonary  process.  2. Coronary artery calcifications are noted.  3. Mild cardiomegaly and LEFT ventricular hypertrophy.  4. Findings in the upper abdomen are described in a separate dictation.     Electronically Signed By-Dr. Jacoby Ambrocio MD On:1/18/2017 5:27 PM  EST  This report was finalized on 01/18/2017 16:27 by Dr. Jacoby Ambrocio MD.    MRI Brain Without Contrast [[id number]] Collected:  01/18/17 2052     Updated:  01/18/17 2102    Narrative:       EXAMINATION:  MRI BRAIN WO CONTRAST-  1/18/2017 8:36 PM CST     HISTORY: Altered mental status.     TECHNIQUE: Multiplanar imaging was performed in a high field magnet.     COMPARISON: No comparison study.     FINDINGS: There are acute bilateral cerebellar infarcts. The infarcts  are greater on the right side. There is bilateral PICA distribution  infarct and on the right side there is also infarct that involves the  more superior and medial aspect of the cerebellum. There is diffusion  restriction on the ADC map images. There is edema in these areas on the  T2-weighted images seen best on the FLAIR sequence. There are  no  cerebral hemispheric acute infarcts. There are scattered areas of T2  high signal within both cerebellar hemispheres that is nonspecific and  most likely due to chronic small vessel disease. There is a mild degree  of atrophy with associated ventricular prominence. There are normal flow  voids in both vertebral arteries and the basilar artery.       Impression:       1. Acute bilateral cerebellar infarcts that are fairly large in size.  2. Chronic ischemic white matter disease involving the cerebral  hemispheres.  3. Mild atrophy.  4. Mucosal inflammatory changes involving the paranasal sinuses.     The full report of this exam was immediately signed and available to the  emergency room. The patient is currently in the emergency room.     Electronically Signed By-Dr. Neil Chatman MD On:1/18/2017 10:00 PM EST  This report was finalized on 01/18/2017 21:00 by Dr. Neil Chatman MD.          I have reviewed the patient current medications.     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 Acute bilateral cerebellar infarcts  2. Elevated troponin - could be 2/2 acute CVA  3. Nausea and vomiting  4. DMII - insulin dependent  5. HTN - poorly controlled  6. Urinary Retention - >1000ml after catheterization in ED  7. Splenic Infarct on CT A/P  8. Constipation - mild per CT  9. LENA - currently on CPAP  10.  Elevated lipase - trended down to 254; LFTs negative  11.  Hypokalemia    Plan:  1.  Repeat CT Head STAT; worrisome change in neuro status and need to rule out extension and herniation now.  2.  ABG STAT  3.  Appreciate Neuro input  4.  CTA Head with atherosclerotic plaque with at least mild narrowing of the distal internal carotid arteries at level of skull base. Focal narrowing of each distal vertebral artery, left greater than right. CTA Neck with left common carotid artery 50-69%  stenosis at its bifurcation.    5.  Echo with diastolic dysfunction, EFs preserved, no valvular abnormalities; LVH  6.  Permissive HTN in setting of acute CVA and Neuro reports to treat SBP>180; IV Labetalol PRN per stroke parameters  7.  PT/OT/ST  8.  ASA dose increased; continue statin  9.  Lovenox at PPx dose  10. Adjust Levemir today given hyperglycemia; SSI coverage; monitor BSs closely  11.  Thomason placed for urinary retention (>1000ml); on Flomax and would like to have voiding trial soon.      Kody Tellez MD   01/20/17   8:55 AM     Unfortunately, stat CAT scan of the brain reveals significant progression of cerebellar edema and mass effect, now also with evidence of hemorrhagic staining.  There is midline shift in addition to tonsillar herniation. There is associated fourth ventricle compression with obstructive hydrocephalus.  A CODE BLUE was called while patient was down receiving this CAT scan, and did require endotracheal intubation prior to getting his CAT scan performed.  I did call Dr. Marino with neurosurgery and spoke with him while I was down in radiology as patient was going through the CAT scan given concern for stroke progression and possible herniation, which is now been confirmed.  I have spoken with both Dr. Marino and Dr. Riley in the intensive care unit today, and I have also updated the patient's wife at bedside.  Prognosis is grim, and wife understands the ramifications of the recent change in clinical status which has occurred.  She is contacting her family now, including her daughter that lives in [place], [place], to come to our facility ASAP given these recent changes.  I will remain available to assist the patient and family in any way possible as they go through this very difficult circumstance.    Kody Tellez MD  01/20/17  10:38 AM

## 2017-01-20 NOTE — PLAN OF CARE
Problem: Patient Care Overview (Adult)  Goal: Plan of Care Review  Outcome: Ongoing (interventions implemented as appropriate)    01/20/17 1730   Coping/Psychosocial Response Interventions   Plan Of Care Reviewed With spouse;family   Patient Care Overview   Progress declining   Outcome Evaluation   Outcome Summary/Follow up Plan Pt unresponsive throughout the day GSC 3, no cough/gag/blink. Pupils sluggish and a 3mm. Pt in normal sinus-sinus tach in low 100's with occassional PVCs. Pt emergently intubated today when going for CT of head. AC 20 Fio2 100%. Pt is breathing over the vent at rate of 27-29. No medciation seem to change respiratory status. Pt abdomen distended but soft with active bowel sounds. Code status discussed with family and wish to keep pt on vent until family from out of state is able to arrive then will change to comfort. Wife and daughter state to not initiate CPR if pt deteriorates before family arrival.          Problem: Acute Coronary Syndrome (ACS) (Adult)  Goal: Signs and Symptoms of Listed Potential Problems Will be Absent or Manageable (Acute Coronary Syndrome)  Outcome: Ongoing (interventions implemented as appropriate)    Problem: Stroke (Ischemic) (Adult)  Goal: Signs and Symptoms of Listed Potential Problems Will be Absent or Manageable (Stroke)  Outcome: Ongoing (interventions implemented as appropriate)

## 2017-01-21 NOTE — THERAPY DISCHARGE NOTE
Acute Care - Physical Therapy Discharge Summary  [facility]       Patient Name: Ranjeet Bajwa  : 1945  MRN: [id number]    Today's Date: 2017  Onset of Illness/Injury or Date of Surgery Date: 17    Date of Referral to PT: 17  Referring Physician: Dr. Tellez      Admit Date: 2017      PT Recommendation and Plan    Visit Dx:    ICD-10-CM ICD-9-CM   1. NSTEMI (non-ST elevated myocardial infarction) I21.4 410.70   2. Cerebrovascular accident (CVA), unspecified mechanism I63.9 434.91   3. Oropharyngeal dysphagia R13.12 787.22   4. Impaired functional mobility, balance, and endurance Z74.09 V49.89   5. Impaired mobility and ADLs Z74.09 799.89   6. Respiratory arrest R09.2 799.1             Outcome Measures       17 1100 17 0821       How much help from another person do you currently need...    Turning from your back to your side while in flat bed without using bedrails?  3  -REHANA (r) SM (t) REHANA (c)     Moving from lying on back to sitting on the side of a flat bed without bedrails?  3  -REHANA (r) SM (t) REHANA (c)     Moving to and from a bed to a chair (including a wheelchair)?  2  -REHANA (r) SM (t) REHANA (c)     Standing up from a chair using your arms (e.g., wheelchair, bedside chair)?  3  -REHANA (r) SM (t) REHANA (c)     Climbing 3-5 steps with a railing?  1  -REHANA (r) SM (t) REHANA (c)     To walk in hospital room?  2  -REHANA (r) SM (t) REHANA (c)     AM-PAC 6 Clicks Score  14  -REHANA (r) SM (t)     How much help from another is currently needed...    Putting on and taking off regular lower body clothing? 2  -MM      Bathing (including washing, rinsing, and drying) 2  -MM      Toileting (which includes using toilet bed pan or urinal) 2  -MM      Putting on and taking off regular upper body clothing 2  -MM      Taking care of personal grooming (such as brushing teeth) 3  -MM      Eating meals 4  -MM      Score 15  -MM      Functional Assessment    Outcome Measure Options AM-PAC 6 Clicks Daily Activity (OT)  -MM  AM-PAC 6 Clicks Basic Mobility (PT)  -REHANA (r) SM (t) REHANA (c)       User Key  (r) = Recorded By, (t) = Taken By, (c) = Cosigned By    Initials Name Provider Type    REHANA Morrissey, PT DPT Physical Therapist    MM Gregory Rodney, OTR/L Occupational Therapist     Shawna Mulvihill, PT Student PT Student                      IP PT Goals       01/21/17 0823 01/19/17 0821       Bed Mobility PT LTG    Bed Mobility PT LTG, Date Established  01/19/17  -REHANA (r) SM (t) REHANA (c)     Bed Mobility PT LTG, Time to Achieve  by discharge  -REHANA (r) SM (t) REHANA (c)     Bed Mobility PT LTG, Activity Type  all bed mobility  -REHANA (r) SM (t) REHANA (c)     Bed Mobility PT LTG, [place] Level  independent  -REHANA (r) SM (t) REHANA (c)     Bed Mobility PT Goal  LTG, Assist Device  bed rails  -REHANA (r) SM (t) REHANA (c)     Bed Mobility PT LTG, Date Goal Reviewed 01/21/17  -      Bed Mobility PT LTG, Outcome goal not met  -YONATAN      Bed Mobility PT LTG, Reason Goal Not Met discharged from facility  -YONATAN      Transfer Training PT LTG    Transfer Training PT LTG, Date Established  01/19/17  -REHANA (r) SM (t) REHANA (c)     Transfer Training PT LTG, Time to Achieve  by discharge  -REHANA (r) SM (t) REHANA (c)     Transfer Training PT LTG, Activity Type  bed to chair /chair to bed;sit to stand/stand to sit  -REHANA (r) SM (t) REHANA (c)     Transfer Training PT LTG, [place] Level  conditional independence  -REHANA (r) SM (t) REHANA (c)     Transfer Training PT LTG, Assist Device  walker, rolling  -REHANA (r) SM (t) REHANA (c)     Transfer Training PT  LTG, Date Goal Reviewed 01/21/17  -YONATAN      Transfer Training PT LTG, Outcome goal not met  -YONATAN      Transfer Training PT LTG, Reason Goal Not Met discharged from facility  -YONATAN      Gait Training PT LTG    Gait Training Goal PT LTG, Date Established  01/19/17  -REHANA (r) SM (t) REHANA (c)     Gait Training Goal PT LTG, Time to Achieve  by discharge  -REHANA (r) SM (t) REHANA (c)     Gait Training Goal PT LTG, [place] Level  supervision required  -REHANA oro) JUSTIN  (t) REHANA (c)     Gait Training Goal PT LTG, Assist Device  walker, rolling  -REHAAN (r) SM (t) REHANA (c)     Gait Training Goal PT LTG, Distance to Achieve  150  -REHANA (r) SM (t) REHANA (c)     Gait Training Goal PT LTG, Date Goal Reviewed 17  -      Gait Training Goal PT LTG, Outcome goal not met  -      Gait Training Goal PT LTG, Reason Goal Not Met discharged from facility  -YONATAN      Dynamic Standing Balance PT LTG    Dynamic Standing Balance PT LTG, Date Established  17  -REHANA (r) SM (t) REHANA (c)     Dynamic Standing Balance PT LTG, Time to Achieve  by discharge  -REHANA (r) SM (t) REHANA (c)     Dynamic Standing Balance PT LTG, [place] Level  supervision required;contact guard assist  -REHANA (r) SM (t) REHANA (c)     Dynamic Standing Balance PT LTG, Additional Goal  Standing balance activities of head movements and LE pre-gait activities such as multi-directional weight shifts and marching x10 reps to improve strength and independence  -REHANA (r) SM (t) REHANA (c)     Dynamic Standing Balance PT LTG, Date Goal Reviewed 17  -      Dynamic Standing Balance PT LTG, Outcome goal not met  -      Dynamic Standing Balance PT LTG, Reason Goal Not Met discharged from facility  -        User Key  (r) = Recorded By, (t) = Taken By, (c) = Cosigned By    Initials Name Provider Type    REHANA Morrissey, PT DPT Physical Therapist    YONATAN Martinez, PTA Physical Therapy Assistant    SM Shawna Mulvihill, PT Student PT Student              PT Discharge Summary  Anticipated Discharge Disposition: other (see comments) ()  Reason for Discharge: Discharge from facility  Outcomes Achieved: Refer to plan of care for updates on goals achieved  Discharge Destination: (S) other (comment) ()      Eliazar Martinez, DONALD   2017

## 2017-01-21 NOTE — PLAN OF CARE
Problem: Inpatient Occupational Therapy  Goal: Transfer Training Goal 1 LTG- OT    01/19/17 1431 01/21/17 0702   Transfer Training OT LTG   Transfer Training OT LTG, Date Established 01/19/17 --    Transfer Training OT LTG, Time to Achieve by discharge --    Transfer Training OT LTG, Activity Type all transfers --    Transfer Training OT LTG, [place] Level conditional independence --    Transfer Training OT LTG, Assist Device walker, rolling --    Transfer Training OT LTG, Date Goal Reviewed --  01/21/17   Transfer Training OT LTG, Outcome --  goal not met   Transfer Training OT LTG, Reason Goal Not Met --  discharged from facility       Goal: Grooming Goal LTG- OT    01/19/17 1431 01/21/17 0702   Grooming OT LTG   Grooming Goal OT LTG, Date Established 01/19/17 --    Grooming Goal OT LTG, Time to Achieve by discharge --    Grooming Goal OT LTG, [place] Level independent --    Grooming Goal OT LTG, Position standing --    Grooming Goal OT LTG, Date Goal Reviewed --  01/21/17   Grooming Goal OT LTG, Outcome --  goal not met   Grooming Goal OT LTG, Reason Goal Not Met --  discharged from facility       Goal: Toileting Goal LTG- OT    01/19/17 1431 01/21/17 0702   Toileting OT LTG   Toileting Goal OT LTG, Date Established 01/19/17 --    Toileting Goal OT LTG, Time to Achieve by discharge --    Toileting Goal OT LTG, [place] Level independent --    Toileting Goal OT LTG, Date Goal Reviewed --  01/21/17   Toileting Goal OT LTG, Outcome --  goal not met   Toileting Goal OT LTG, Reason Goal Not Met --  discharged from facility       Goal: LB Dressing Goal LTG- OT  Outcome: Unable to achieve outcome(s) by discharge Date Met:  01/21/17 01/19/17 1431 01/21/17 0702   LB Dressing OT LTG   LB Dressing Goal OT LTG, Date Established 01/19/17 --    LB Dressing Goal OT LTG, Time to Achieve by discharge --    LB Dressing Goal OT LTG, [place] Level conditional independence --    LB Dressing Goal OT LTG, Adaptive  Equipment reacher;shoe horn, long handled;sock-aid  (AE PRN) --    LB Dressing Goal OT LTG, Date Goal Reviewed --  01/21/17   LB Dressing Goal OT LTG, Outcome --  goal not met   LB Dressing Goal OT LTG, Reason Goal Not Met --  discharged from facility       Goal: Coordination Goal LTG- OT  Outcome: Unable to achieve outcome(s) by discharge Date Met:  01/21/17 01/19/17 1437 01/21/17 0702   Coordination OT LTG   Coordination OT LTG, Date Established 01/19/17 --    Coordination OT LTG, Time to Achieve by discharge --    Coordination OT LTG, Activity Type GM task --    Coordination OT LTG, [place] Level independent --    Coordination OT LTG, Date Goal Reviewed --  01/21/17   Coordination OT LTG, Outcome --  goal not met   Coordination OT LTG, Reason Goal Not Met --  discharged from facility

## 2017-01-21 NOTE — PLAN OF CARE
Problem: Inpatient Physical Therapy  Goal: Bed Mobility Goal LTG- PT  Outcome: Unable to achieve outcome(s) by discharge Date Met:  01/21/17 01/19/17 0821 01/21/17 0823   Bed Mobility PT LTG   Bed Mobility PT LTG, Date Established 01/19/17 --    Bed Mobility PT LTG, Time to Achieve by discharge --    Bed Mobility PT LTG, Activity Type all bed mobility --    Bed Mobility PT LTG, Protection Level independent --    Bed Mobility PT Goal LTG, Assist Device bed rails --    Bed Mobility PT LTG, Date Goal Reviewed --  01/21/17   Bed Mobility PT LTG, Outcome --  goal not met   Bed Mobility PT LTG, Reason Goal Not Met --  discharged from facility       Goal: Transfer Training Goal 1 LTG- PT  Outcome: Unable to achieve outcome(s) by discharge Date Met:  01/21/17 01/19/17 0821 01/21/17 0823   Transfer Training PT LTG   Transfer Training PT LTG, Date Established 01/19/17 --    Transfer Training PT LTG, Time to Achieve by discharge --    Transfer Training PT LTG, Activity Type bed to chair /chair to bed;sit to stand/stand to sit --    Transfer Training PT LTG, Protection Level conditional independence --    Transfer Training PT LTG, Assist Device walker, rolling --    Transfer Training PT LTG, Date Goal Reviewed --  01/21/17   Transfer Training PT LTG, Outcome --  goal not met   Transfer Training PT LTG, Reason Goal Not Met --  discharged from facility       Goal: Gait Training Goal LTG- PT  Outcome: Unable to achieve outcome(s) by discharge Date Met:  01/21/17 01/19/17 0821 01/21/17 0823   Gait Training PT LTG   Gait Training Goal PT LTG, Date Established 01/19/17 --    Gait Training Goal PT LTG, Time to Achieve by discharge --    Gait Training Goal PT LTG, Protection Level supervision required --    Gait Training Goal PT LTG, Assist Device walker, rolling --    Gait Training Goal PT LTG, Distance to Achieve 150 --    Gait Training Goal PT LTG, Date Goal Reviewed --  01/21/17   Gait Training Goal PT LTG, Outcome  --  goal not met   Gait Training Goal PT LTG, Reason Goal Not Met --  discharged from facility       Goal: Dynamic Standing Balance Goal LTG- PT  Outcome: Unable to achieve outcome(s) by discharge Date Met:  01/21/17 01/19/17 0821 01/21/17 0823   Dynamic Standing Balance PT LTG   Dynamic Standing Balance PT LTG, Date Established 01/19/17 --    Dynamic Standing Balance PT LTG, Time to Achieve by discharge --    Dynamic Standing Balance PT LTG, [place] Level supervision required;contact guard assist --    Dynamic Standing Balance PT LTG, Additional Goal Standing balance activities of head movements and LE pre-gait activities such as multi-directional weight shifts and marching x10 reps to improve strength and independence --    Dynamic Standing Balance PT LTG, Date Goal Reviewed --  01/21/17   Dynamic Standing Balance PT LTG, Outcome --  goal not met   Dynamic Standing Balance PT LTG, Reason Goal Not Met --  discharged from facility

## 2017-01-21 NOTE — THERAPY DISCHARGE NOTE
Acute Care - Occupational Therapy Initial Eval/Discharge  [facility]     Patient Name: Ranjeet Bajwa  : 1945  MRN: [id number]  Today's Date: 2017  Onset of Illness/Injury or Date of Surgery Date: 17  Date of Referral to OT: 17  Referring Physician: Dr. Tellez      Admit Date: 2017       ICD-10-CM ICD-9-CM   1. NSTEMI (non-ST elevated myocardial infarction) I21.4 410.70   2. Cerebrovascular accident (CVA), unspecified mechanism I63.9 434.91   3. Oropharyngeal dysphagia R13.12 787.22   4. Impaired functional mobility, balance, and endurance Z74.09 V49.89   5. Impaired mobility and ADLs Z74.09 799.89   6. 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   • NSTEMI (non-ST elevated myocardial infarction)     Past Medical History   Diagnosis Date   • Coronary artery disease    • Diabetes mellitus    • Hypertension      Past Surgical History   Procedure Laterality Date   • Cardiac catheterization            OT ASSESSMENT FLOWSHEET (last 72 hours)      OT Evaluation       17 0701 17 0000 17 2000 17 1600 17 1200    Clinical Impression    Anticipated Discharge Disposition other (see comments)     -ND        Sensory Assessment/Intervention    Light Touch  LUE;RUE;LLE;RLE  -RM LUE;RUE;LLE;RLE  -RM LUE;RUE;LLE;RLE  -CH     LUE Light Touch  absent sensation  -RM absent sensation  -RM absent sensation  -CH absent sensation  -CH    RUE Light Touch  absent sensation  -RM absent sensation  -RM absent sensation  -CH absent sensation  -CH    LLE Light Touch  absent sensation  -RM absent sensation  -RM absent sensation  -CH absent sensation  -CH    RLE Light Touch  absent sensation  -RM absent sensation  -RM absent sensation  -CH absent sensation  -CH      17 0800 17 0835 17 0830 17 0821 17 0638    Rehab Evaluation    Document Type  evaluation  -MM evaluation  -KW (r) LH (t) KW (c)  evaluation   See MAR  -REHANA (r) SM (t) REHANA (c)     Subjective Information  agree to therapy;complains of;fatigue;nausea/vomiting;dizziness   no nausea/dizziness intiailly, only with movement  -MM complains of;nausea/vomiting  -KW (r) LH (t) KW (c) agree to therapy;complains of;fatigue  -REHANA (r) SM (t) REHANA (c)     Symptoms Noted During/After Treatment   none  -KW (r) LH (t) KW (c) shortness of breath   Pt. reported SOB when first sitting EOB  -REHANA (r) SM (t) REHANA (c)     Symptoms Noted Comment   Pt was nauseated when head was first elevated.  -KW (r) LH (t) KW (c) Pt. vomited after returning to bed  -REHANA (r) SM (t) REHANA (c)     General Information    Patient Profile Review  yes  -MM  yes  -REHANA (r) SM (t) REHANA (c)     Onset of Illness/Injury or Date of Surgery Date  01/18/17  -MM  01/18/17  -REHANA (r) JUSTIN (t) REHANA (c)     Referring Physician  Dr. Tellez  -MM  Dr. Tellez   stroke patient  -REHANA (r) SM (t) REHANA (c)     General Observations  pt in fowlers, spouse present, blood pressure cuff, SpO2 monitor, telemetry, IV, hollins  -MM  Fowlers in bed, alert, wife present  -REHANA (r) SM (t) REHANA (c)     Pertinent History Of Current Problem    CC: nausea/vomiting, abnormal speech, weakness. Dx: Possible TIA vs. CVA, elevated troponin & possible NSTEMI, DM 2. 1/18 MRI brain: Acute bilateral cerebellar infarcts that are fairly large in size. Chronic ischemic white matter disease involving cerebral hemispheres. Mild atrophy. Mucosal inflammatory changes involving paranasal sinuses. 1/18 CT angiogram chest: Coronary artery calcifications are noted. 1/18 CT abdomen/pelvis: smal area of splenic infarction (likely acute). 1/18 CXR. 1/18 CT head.  -REHANA (r) SM (t) REHANA (c)     Precautions/Limitations  fall precautions  -MM  fall precautions  -REHANA (r) SM (t) REHANA (c)     Prior Level of Function  independent:;all household mobility;community mobility;feeding;grooming;dressing;bathing;cooking;cleaning;driving  -MM  independent:;all household mobility;community  mobility;gait;transfer;ADL's;feeding;bathing;dressing;cleaning;driving  -REHANA (r) SM (t) REHANA (c)     Equipment Currently Used at Home  oxygen;crutches, axillary;cane, straight;walker, standard   CPAP at night  -MM  cane, straight;crutches;walker, standard;oxygen   CPAP at night  -REHANA (r) SM (t) REHANA (c) none  -CI    Plans/Goals Discussed With  patient and family;agreed upon  -MM  patient;spouse/S.O.;agreed upon  -REHANA (r) SM (t) REHANA (c)     Risks Reviewed  patient and family:;LOB;nausea/vomiting;dizziness;increased discomfort;change in vital signs;increased drainage;lines disloged  -MM  patient and family:;LOB;nausea/vomiting;dizziness;increased discomfort;change in vital signs  -REHANA (r) SM (t) REHANA (c)     Benefits Reviewed  patient and family:;improve function;increase independence;increase strength;increase balance;decrease pain;decrease risk of DVT;improve skin integrity;increase knowledge  -MM  patient and family:;improve function;increase independence;increase strength;increase balance;increase knowledge;improve skin integrity;decrease pain  -REHANA     Barriers to Rehab    medically complex  -REHANA     Living Environment    Lives With  spouse  -MM  spouse  -REHANA (r) SM (t) REHANA (c)     Living Arrangements  house  -MM  house  -REHANA (r) SM (t) REHANA (c)     Home Accessibility  grab bars present (bathtub);grab bars present (toilet);stairs to enter home;bed and bath on same level   walk in shower  -MM  bed and bath on same level;stairs to enter home;grab bars present (bathtub);grab bars present (toilet)  -REHANA (r) SM (t) REHANA (c)     Number of Stairs to Enter Home  2  -MM  2  -REHANA (r) SM (t) REHANA (c)     Stair Railings at Home  none  -MM  none  -REHANA (r) SM (t) REHANA (c)     Transportation Available  car;family or friend will provide  -MM   family or friend will provide  -CI    Clinical Impression    Date of Referral to OT  01/18/17  -MM       OT Diagnosis  impaired mobility and ADLs  -MM       Impairments Found (describe specific impairments)   ergonomics and body mechanics;gait, locomotion, and balance  -MM       Patient/Family Goals Statement  return home  -MM       Criteria for Skilled Therapeutic Interventions Met  yes;treatment indicated  -MM       Rehab Potential  good, to achieve stated therapy goals  -MM       Therapy Frequency  3-5 times/wk  -MM       Predicted Duration of Therapy Intervention (days/wks)  until discharge  -MM       Anticipated Equipment Needs At Discharge  --   hip kit  -MM       Anticipated Discharge Disposition  home with assist;home with home health  -MM       Vital Signs    Pre Systolic BP Rehab  177  -MM  177  -REHANA (r) SM (t) REHANA (c)     Pre Treatment Diastolic BP  71  -MM  71  -REHANA (r) SM (t) REHANA (c)     Post Systolic BP Rehab  195  -MM  195  -REHANA (r) SM (t) REHANA (c)     Post Treatment Diastolic BP  75  -MM  75  -REHANA (r) SM (t) REHANA (c)     Pretreatment Heart Rate (beats/min)  70  -MM  64  -REHANA (r) SM (t) REHANA (c)     Pretreatment Resp Rate (breaths/min)  21  -MM       Pre SpO2 (%)  92  -MM  93  -REHANA (r) SM (t) REHANA (c)     O2 Delivery Pre Treatment  room air  -MM  room air  -REHANA (r) SM (t) REHANA (c)     Intra SpO2 (%)  88   Lowest O2, increased with cuing for adaptive breathing  -MM  97   Sitting EOB first time  -REHANA (r) SM (t) REHANA (c)     O2 Delivery Intra Treatment  room air  -MM  room air  -REHANA (r) SM (t) REHANA (c)     Post SpO2 (%)  91  -MM  91  -REHANA (r) SM (t) REHANA (c)     O2 Delivery Post Treatment  room air  -MM  room air  -REHANA (r) SM (t) REHANA (c)     Pre Patient Position  Supine  -MM  Supine  -REHANA (r) SM (t) REHANA (c)     Intra Patient Position  Sitting  -MM  Sitting  -REHANA (r) SM (t) REHANA (c)     Post Patient Position  Supine  -MM  Supine  -REHANA (r) SM (t) REHANA (c)     Pain Assessment    Pain Assessment  No/denies pain  -MM No/denies pain  -KW (r) LH (t) KW (c) No/denies pain  -REHANA (r) SM (t) REHANA (c)     Vision Assessment/Intervention    Visual Impairment  WFL with corrective lenses  -MM  WFL with corrective lenses;blurred;diplopia   Per pt. report. Sometimes  has blurred vision & diplopia.  -REHANA (r) SM (t) REHANA (c)     Visual Field    right visual field impairment   Peripheral visual assessment impaired on R  -REHANA (r) SM (t) REHANA (c)     Impact of Vision Impairment on Function    Impaired peripheral vision, blurred vision, and diplopia may affect balance and safety during activity.  -REHANA (r) SM (t) REHANA (c)     Cognitive Assessment/Intervention    Current Cognitive/Communication Assessment  functional  -MM  functional  -REHANA (r) SM (t) REHANA (c)     Orientation Status  oriented x 4  -MM  oriented x 4  -REHANA (r) SM (t) REHANA (c)     Follows Commands/Answers Questions  100% of the time;able to follow multi-step instructions  -MM  able to follow multi-step instructions;100% of the time;needs increased time  -REHANA (r) SM (t) REHANA (c)     Personal Safety  WNL/WFL  -MM  good awareness, safety precautions  -REHANA (r) SM (t) REHANA (c)     Personal Safety Interventions  fall prevention program maintained;gait belt;muscle strengthening facilitated;nonskid shoes/slippers when out of bed;supervised activity  -MM  fall prevention program maintained;gait belt;muscle strengthening facilitated;nonskid shoes/slippers when out of bed  -REHANA (r) SM (t) REHANA (c)     ROM (Range of Motion)    General ROM Detail  BUE WFL, see PT note for BLE  -MM  B LE AROM WFL. See OT IE for UE assessment.  -REHANA (r) SM (t) REHANA (c)     MMT (Manual Muscle Testing)    General MMT Assessment Detail  BUE 4+/5, see PT note for BLE  -MM  R hip flexion 3+/5, L hip flexion 4-/5, B knee extension & flexion 5/5  -REHANA (r) SM (t) REHANA (c)     Bed Mobility, Assessment/Treatment    Bed Mobility, Assistive Device    bed rails;head of bed elevated;draw sheet  -REHANA (r) SM (t) REHANA (c)     Bed Mobility, Scoot/Bridge, [place]  minimum assist (75% patient effort);2 person assist required  -MM  minimum assist (75% patient effort);2 person assist required  -REHANA (r) SM (t) REHANA (c)     Bed Mob, Supine to Sit, [place]  contact guard assist;verbal cues required  -MM   contact guard assist;verbal cues required  -REHANA (r) SM (t) REHANA (c)     Bed Mob, Sit to Supine, [place]  minimum assist (75% patient effort);contact guard assist;verbal cues required  -MM  contact guard assist;verbal cues required  -REHANA (r) SM (t) REHANA (c)     Bed Mobility, Safety Issues    decreased use of arms for pushing/pulling;decreased use of legs for bridging/pushing;impaired trunk control for bed mobility  -REHANA (r) SM (t) REHANA (c)     Bed Mobility, Impairments  impaired balance  -MM  strength decreased  -REHANA (r) SM (t) REHANA (c)     Bed Mobility, Comment  upon returning to bed, pt became nauseous/vomitting  -MM       Transfer Assessment/Treatment    Transfers, Sit-Stand [place]  contact guard assist;2 person assist required  -MM  minimum assist (75% patient effort);2 person assist required;upper extremity support;hand held assist;verbal cues required  -REHANA (r) SM (t) REHANA (c)     Transfers, Stand-Sit [place]  contact guard assist;2 person assist required  -MM  minimum assist (75% patient effort);2 person assist required;verbal cues required;hand held assist;upper extremity support  -REHANA (r) SM (t) REHANA (c)     Transfers, Sit-Stand-Sit, Assist Device  --   hand held assist  -MM       Toilet Transfer, [place]    minimum assist (75% patient effort);2 person assist required  -REHANA (r) SM (t) REHANA (c)     Toilet Transfer, Assistive Device    --   BSC  -REHANA (r) SM (t) REHANA (c)     Transfer, Safety Issues  step length decreased;balance decreased during turns  -MM  balance decreased during turns;step length decreased  -REHANA (r) SM (t) REHANA (c)     Transfer, Impairments  impaired balance  -MM  strength decreased;impaired balance;coordination impaired  -REHANA (r) SM (t) REHANA (c)     Functional Mobility    Functional Mobility- Ind. Level  contact guard assist;2 person assist required  -MM       Functional Mobility- Device  --   hand held assist  -MM       Functional Mobility- Comment  Pt ambulated from bed to BS and back to bed  -MM        Upper Body Dressing Assessment/Training    UB Dressing Assess/Train, Clothing Type  doffing:;donning:;hospital gown  -MM       UB Dressing Assess/Train, Position  supine  -MM       UB Dressing Assess/Train, [place]  minimum assist (75% patient effort);moderate assist (50% patient effort)  -MM       UB Dressing Assess/Train, Impairments  impaired balance   nausea  -MM       Lower Body Dressing Assessment/Training    LB Dressing Assess/Train, Clothing Type  doffing:;donning:;slipper socks  -MM       LB Dressing Assess/Train, Position  edge of bed;sitting  -MM       LB Dressing Assess/Train, [place]  moderate assist (50% patient effort)   Pt able to complete left foot, difficulty with R foot  -MM       LB Dressing Assess/Train, Impairments  impaired balance  -MM       Toileting Assessment/Training    Toileting Assess/Train, Comment  Pt unable to void at BS  -MM       Motor Skills/Interventions    Additional Documentation  Fine Motor Coordination Training (Group);Gross Motor Coordination Training (Group)  -MM  Balance Skills Training (Group);Gross Motor Coordination Training (Group)  -REHANA (r) SM (t) REHANA (c)     Balance Skills Training    Sitting-Level of Assistance    Close supervision;Contact guard  -REHANA (r) SM (t) REHANA (c)     Sitting-Balance Support    Right upper extremity supported;Left upper extremity supported;Feet supported  -REHANA (r) SM (t) REHANA (c)     Sitting # of Minutes    30  -REHANA (r) SM (t) REHANA (c)     Standing-Level of Assistance    Minimum assistance;x2  -REHANA (r) SM (t) REHANA (c)     Static Standing Balance Support    Right upper extremity supported;Left upper extremity supported  -REHANA (r) SM (t) REHANA (c)     Gait Balance-Level of Assistance    Minimum assistance;x2  -REHANA (r) SM (t) REHANA (c)     Gait Balance Support    Right upper extremity supported;Left upper extremity supported  -REHANA (r) SM (t) REHANA (c)     Gross Motor Coordination Training    Gross Motor Skill, Impairments Detail  R hand dominant. Finger  to nose impaired BUE. Incosistent impairment between testing: first trial R>L impairment, second trial L>R impairment  -MM  Inconsistent impairment between L & R UE. Required additional time and verbal cues for thumb to finger activity (R>L), finger to nose activity (R>L), and rapid alternating hand movements (L>R). Pt. is R hand dominant.  -REHANA (r) SM (t) REHANA (c)     Fine Motor Coordination Training    Opposition  Right:;Left:;intact  -MM       Sensory Assessment/Intervention    Sensory Impairment    --   Gross LE light touch sensation WFL per pt. report  -REHANA (r) SM (t) REHANA (c)     Light Touch  LUE;RUE  -MM       LUE Light Touch severe impairment  -CH WNL  -MM       RUE Light Touch severe impairment  -CH WNL  -MM       LLE Light Touch severe impairment  -CH        RLE Light Touch severe impairment  -CH        General Therapy Interventions    Planned Therapy Interventions  ADL retraining;adaptive equipment training;activity intolerance;balance training;bed mobility training;energy conservation;fine motor coordination training;home exercise program;strengthening;transfer training  -MM       Positioning and Restraints    Pre-Treatment Position  in bed  -MM  in bed  -REHANA (r) SM (t) REHANA (c)     Post Treatment Position  bed  -MM  bed  -REHANA (r) SM (t) REHANA (c)     In Bed  notified nsg;supine;call light within reach;encouraged to call for assist;patient within staff view;with family/caregiver;with nsg;side rails up x2   emesis bag in lap  -MM  fowlers;notified nsg;with family/caregiver;call light within reach;encouraged to call for assist  -REHANA (r) SM (t) REHANA (c)       01/19/17 0200 01/19/17 0148 01/19/17 0145 01/18/17 2200       Rehab Evaluation    Document Type    --  -KS     General Information    Equipment Currently Used at Home   none  -KS      Living Environment    Lives With  spouse  -KS       Living Arrangements  house  -KS       Home Accessibility  no concerns  -KS       Stair Railings at Home  none  -KS       Type of  Financial/Environmental Concern  none  -KS       Transportation Available  car  -KS       Functional Level Prior    Ambulation   0-->independent  -KS      Transferring   0-->independent  -KS      Toileting   0-->independent  -KS      Bathing   0-->independent  -KS      Dressing   0-->independent  -KS      Eating   0-->independent  -KS      Communication   0-->understands/communicates without difficulty  -KS      Swallowing   0-->swallows foods/liquids without difficulty  -KS      Muscle Tone Assessment    Muscle Tone Assessment --  -KS          User Key  (r) = Recorded By, (t) = Taken By, (c) = Cosigned By    Initials Name Effective Dates    REHANA Aramis Morrissey, PT DPT 08/02/16 -     KW Michelle Grace, MS [facility]-SLP 08/02/16 -     CH Cheri Perez, RN 08/02/16 -     CI Violet Mccloud, JAKOB 08/02/16 -     KS Susan Etienne, RN 08/02/16 -     RM Coretta Sood, RN 08/02/16 -     MM Gregory Rodney, OTR/L 10/21/16 -     STEVEN Danielle OTR/L 10/21/16 -     SM Shawna Mulvihill, PT Student 10/21/16 -     LH Tess Mckinley, Speech Therapy Student 12/19/16 -           Occupational Therapy Education     Title: PT OT SLP Therapies (Active)     Topic: Occupational Therapy (Resolved)     Point: ADL training (Resolved)    Description: Instruct learner(s) on proper safety adaptation and remediation techniques during self care or transfers.   Instruct in proper use of assistive devices.    Learning Progress Summary    Learner Readiness Method Response Comment Documented by Status   Patient Acceptance E VU Pt and spouse educated on OT role, benefits and POC MM 01/19/17 1430 Done   Family Acceptance E VU Pt and spouse educated on OT role, benefits and POC MM 01/19/17 1430 Done                      User Key     Initials Effective Dates Name Provider Type Discipline     10/21/16 -  Gregory Rodney, OTR/L Occupational Therapist OT                OT Recommendation and Plan  Anticipated Discharge Disposition: other (see  comments) ()  Planned Therapy Interventions: ADL retraining, adaptive equipment training, activity intolerance, balance training, bed mobility training, energy conservation, fine motor coordination training, home exercise program, strengthening, transfer training  Therapy Frequency: 3-5 times/wk  Plan of Care Review  Plan Of Care Reviewed With: spouse, daughter, family  Progress: declining  Outcome Summary/Follow up Plan: Pt unresponsive throughout the day GSC 3, no cough/gag/blink. Pupils sluggish and a 3mm.  Pt in normal sinus-sinus tach in low 100's with occassional PVCs. Pt emergently intubated today when going for CT of head. AC 20 Fio2 100%. Pt is breathing over the vent at rate of 27-29. No medciation seem to change respiratory status.  Pt abdomen distended but soft with active bowel sounds. Code status discussed with family and wish to keep pt on vent until family from out of state is able to arrive then will change to comfort. Wife and daughter state to not initiate CPR if pt  deteriorates before family arrival.            OT Goals       17 0702 17 1437 17 1431    Transfer Training OT LTG    Transfer Training OT LTG, Date Established   17  -MM    Transfer Training OT LTG, Time to Achieve   by discharge  -MM    Transfer Training OT LTG, Activity Type   all transfers  -MM    Transfer Training OT LTG, [place] Level   conditional independence  -MM    Transfer Training OT LTG, Assist Device   walker, rolling  -MM    Transfer Training OT LTG, Date Goal Reviewed 17  -ND      Transfer Training OT LTG, Outcome goal not met  -ND      Transfer Training OT LTG, Reason Goal Not Met discharged from facility  -ND      Grooming OT LTG    Grooming Goal OT LTG, Date Established   17  -MM    Grooming Goal OT LTG, Time to Achieve   by discharge  -MM    Grooming Goal OT LTG, [place] Level   independent  -MM    Grooming Goal OT LTG, Position   standing  -MM    Grooming Goal OT  LTG, Date Goal Reviewed 01/21/17  -ND      Grooming Goal OT LTG, Outcome goal not met  -ND      Grooming Goal OT LTG, Reason Goal Not Met discharged from facility  -ND      Toileting OT LTG    Toileting Goal OT LTG, Date Established   01/19/17  -MM    Toileting Goal OT LTG, Time to Achieve   by discharge  -MM    Toileting Goal OT LTG, [place] Level   independent  -MM    Toileting Goal OT LTG, Date Goal Reviewed 01/21/17  -ND      Toileting Goal OT LTG, Outcome goal not met  -ND      Toileting Goal OT LTG, Reason Goal Not Met discharged from facility  -ND      LB Dressing OT LTG    LB Dressing Goal OT LTG, Date Established   01/19/17  -MM    LB Dressing Goal OT LTG, Time to Achieve   by discharge  -MM    LB Dressing Goal OT LTG, [place] Level   conditional independence  -MM    LB Dressing Goal OT LTG, Adaptive Equipment   reacher;shoe horn, long handled;sock-aid   AE PRN  -MM    LB Dressing Goal OT LTG, Date Goal Reviewed 01/21/17  -ND      LB Dressing Goal OT LTG, Outcome goal not met  -ND      LB Dressing Goal OT LTG, Reason Goal Not Met discharged from facility  -ND      Coordination OT LTG    Coordination OT LTG, Date Established  01/19/17  -MM     Coordination OT LTG, Time to Achieve  by discharge  -MM     Coordination OT LTG, Activity Type  GM task  -MM     Coordination OT LTG, [place] Level  independent  -MM     Coordination OT LTG, Date Goal Reviewed 01/21/17  -ND      Coordination OT LTG, Outcome goal not met  -ND      Coordination OT LTG, Reason Goal Not Met discharged from facility  -ND        User Key  (r) = Recorded By, (t) = Taken By, (c) = Cosigned By    Initials Name Provider Type    MM Gregory Rodney, OTR/L Occupational Therapist    STEVEN Danielle, OTR/L Occupational Therapist                Outcome Measures       01/19/17 1100 01/19/17 0821       How much help from another person do you currently need...    Turning from your back to your side while in flat bed without using  bedrails?  3  -REHANA (r) SM (t) REHANA (c)     Moving from lying on back to sitting on the side of a flat bed without bedrails?  3  -REHANA (r) SM (t) REHANA (c)     Moving to and from a bed to a chair (including a wheelchair)?  2  -REHANA (r) SM (t) REHANA (c)     Standing up from a chair using your arms (e.g., wheelchair, bedside chair)?  3  -REHANA (r) SM (t) REHANA (c)     Climbing 3-5 steps with a railing?  1  -REHANA (r) SM (t) REHANA (c)     To walk in hospital room?  2  -REHANA (r) SM (t) REHANA (c)     AM-PAC 6 Clicks Score  14  -REHANA (r) SM (t)     How much help from another is currently needed...    Putting on and taking off regular lower body clothing? 2  -MM      Bathing (including washing, rinsing, and drying) 2  -MM      Toileting (which includes using toilet bed pan or urinal) 2  -MM      Putting on and taking off regular upper body clothing 2  -MM      Taking care of personal grooming (such as brushing teeth) 3  -MM      Eating meals 4  -MM      Score 15  -MM      Functional Assessment    Outcome Measure Options AM-PAC 6 Clicks Daily Activity (OT)  -MM AM-PAC 6 Clicks Basic Mobility (PT)  -REHANA (r) SM (t) REHANA (c)       User Key  (r) = Recorded By, (t) = Taken By, (c) = Cosigned By    Initials Name Provider Type    REHANA Morrissey, PT DPT Physical Therapist    MM Gregory Rodney OTR/L Occupational Therapist     Shawna Mulvihill, PT Student PT Student          Time Calculation:           OT G-codes  OT Professional Judgement Used?: Yes  OT Functional Scales Options: AM-PAC 6 Clicks Daily Activity (OT)  Score: 15  Functional Limitation: Self care  Self Care Current Status (): At least 40 percent but less than 60 percent impaired, limited or restricted  Self Care Goal Status (): At least 1 percent but less than 20 percent impaired, limited or restricted    OT Discharge Summary  Anticipated Discharge Disposition: other (see comments) ()  Reason for Discharge: Patient   Outcomes Achieved: Refer to plan of care for updates  on goals achieved  Discharge Destination: other (comment) ()    Akilah Danielle, OTR/L  2017

## 2017-01-21 NOTE — DISCHARGE SUMMARY
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  Bilateral Cerebellar Infarctions complicated by hemorrhagic conversion, brainstem compression, and tonsillar herniation.  2.  Acute hypoxemic respiratory failure requiring endotracheal intubation.    Secondary Cause of Death:  1.  Type II Diabetes Mellitus  2.  HTN - poorly controlled    Consults:   1.  Neurology  2.  Neurosurgery    Hospital Course:  The patient is a 71 y.o. male who presented to [facility] on 1/18/2017 with abrupt onset of symptoms of nausea, vomiting, abnormal speech, and change in mental status while eating at a local restaurant with his spouse.  He was brought to [facility] for further workup and management.  A Code Stroke was called while in our emergency room, and our Neurologist evaluated the patient promptly while in our emergency room.  A stat CAT scan of the head was obtained revealing no acute intracranial abnormality.  Neurology reported at that time that neurological exam appeared to be improving, and patient was not a candidate for thrombolytic therapy.  There was also evidence of mild elevation in his troponin level, and Cardiology was consulted in the emergency room as well.  Patient had experienced no episodes of recent chest pain, and his workup was not consistent with acute coronary syndrome.      Patient was admitted to our intensive care unit for further workup and management, and a stat MRI of his brain was ordered on the night of his admission.  This confirmed the presence of acute  bilateral cerebellar infarcts that were fairly large in size.  Our stroke protocol was initiated.  IV Labetalol was ordered per stroke parameters allowing for some permissive hypertension in the setting of an acute ischemic stroke.  Antiplatelet therapy with aspirin, and high dose statin therapy was also ordered.  Lovenox was ordered, but only at DVT prophylactic dose.  Echocardiogram, CT angiogram of the head and neck, and frequent neuro checks per stroke protocol were also ordered.    The following morning, patient appeared to be making some improvement clinically.  His symptoms of vomiting had stopped, however he continued to have nausea especially with position change.  He was awake and alert; able to follow all commands.  Speech therapy, physical therapy, and occupational therapy had all been consulted as part of the stroke protocol, and he was kept in our ICU for close monitoring.  During the evening of 1/19/2017 and early morning hours of 1/20/17 patient evidently started becoming more restless, confused, and agitated.  The on-call Neuro provider was contacted, and patient was ordered medication to attempt to alleviate those symptoms.  When I had the opportunity to evaluate Mr. Bajwa the following morning, he was wearing his CPAP, his vital signs and lab work were stable at the time, but patient was essentially unresponsive and comatose to my exam, and my primary concern was extension of his stroke and the possibility of herniation.    A STAT CAT scan of the head was obtained revealing significant progression of cerebellar edema and mass effect, with even a component of hyperdensity indicating hemorrhagic staining.  There was associated midline shift and tonsillar herniation, and fourth ventricle compression with obstructive hydrocephalus.  Immediately prior to getting this CAT scan, and while in the radiology department, and Code Blue was called as patient briefly lost pulse and had acute hypoxemic  respiratory failure requiring endotracheal intubation.  I was present in the radiology department during the course of these events.  I called Neurosurgery on a STAT basis, and Dr. Marino was available to attend to the patient promptly, as was Dr. Riley with Neurology.    Unfortunately, given the severity of his current condition he was not a surgical candidate.  I provided an update to his spouse who was at his bedside, as did both Dr. Marino and Dr. Riley.  Family ultimately wanted to pursue a comfort care strategy, but did want him to be maintained on mechanical ventilation until a daughter could arrive to [place] from out of town.  Comfort care measures were initiated, and palliative extubation was performed earlier this morning after all family had arrived.  Patient passed away at 0325 of natural causes.    Physical Exam  Dr. Landeros was the nocturnist physician on-call for the hospitalist service and was notified of the date/time of death.    Kody Tellez MD  01/21/17  8:49 AM      Please note that portions of this note may have been completed with a voice recognition program. Efforts were made to edit the dictations, but occasionally words are mistranscribed.

## 2017-01-26 RX ORDER — TAMSULOSIN HYDROCHLORIDE 0.4 MG/1
CAPSULE ORAL
Qty: 90 CAPSULE | Refills: 5 | OUTPATIENT
Start: 2017-01-26